# Patient Record
Sex: FEMALE | Race: WHITE | Employment: OTHER | URBAN - METROPOLITAN AREA
[De-identification: names, ages, dates, MRNs, and addresses within clinical notes are randomized per-mention and may not be internally consistent; named-entity substitution may affect disease eponyms.]

---

## 2017-04-10 ENCOUNTER — APPOINTMENT (OUTPATIENT)
Dept: GENERAL RADIOLOGY | Age: 77
DRG: 378 | End: 2017-04-10
Attending: EMERGENCY MEDICINE
Payer: MEDICARE

## 2017-04-10 ENCOUNTER — HOSPITAL ENCOUNTER (INPATIENT)
Age: 77
LOS: 3 days | Discharge: HOME | DRG: 378 | End: 2017-04-13
Attending: EMERGENCY MEDICINE | Admitting: HOSPITALIST
Payer: MEDICARE

## 2017-04-10 ENCOUNTER — APPOINTMENT (OUTPATIENT)
Dept: CT IMAGING | Age: 77
DRG: 378 | End: 2017-04-10
Attending: EMERGENCY MEDICINE
Payer: MEDICARE

## 2017-04-10 DIAGNOSIS — R10.32 ABDOMINAL PAIN, LLQ (LEFT LOWER QUADRANT): ICD-10-CM

## 2017-04-10 DIAGNOSIS — I95.9 TRANSIENT HYPOTENSION: ICD-10-CM

## 2017-04-10 DIAGNOSIS — K62.5 RECTAL BLEEDING: Primary | ICD-10-CM

## 2017-04-10 DIAGNOSIS — D64.9 ANEMIA, UNSPECIFIED TYPE: ICD-10-CM

## 2017-04-10 PROBLEM — K92.1 MELENA: Status: ACTIVE | Noted: 2017-04-10

## 2017-04-10 PROBLEM — K92.2 GI BLEED: Status: ACTIVE | Noted: 2017-04-10

## 2017-04-10 LAB
ALBUMIN SERPL BCP-MCNC: 2.8 G/DL (ref 3.4–5)
ALBUMIN/GLOB SERPL: 1 {RATIO} (ref 0.8–1.7)
ALP SERPL-CCNC: 51 U/L (ref 45–117)
ALT SERPL-CCNC: 21 U/L (ref 13–56)
ANION GAP BLD CALC-SCNC: 12 MMOL/L (ref 3–18)
APPEARANCE UR: CLEAR
APTT PPP: 29 SEC (ref 23–36.4)
AST SERPL W P-5'-P-CCNC: 26 U/L (ref 15–37)
ATRIAL RATE: 86 BPM
BACTERIA URNS QL MICRO: NEGATIVE /HPF
BASOPHILS # BLD AUTO: 0.1 K/UL (ref 0–0.1)
BASOPHILS # BLD: 1 % (ref 0–3)
BILIRUB SERPL-MCNC: 0.3 MG/DL (ref 0.2–1)
BILIRUB UR QL: NEGATIVE
BUN SERPL-MCNC: 23 MG/DL (ref 7–18)
BUN/CREAT SERPL: 21 (ref 12–20)
CALCIUM SERPL-MCNC: 7.9 MG/DL (ref 8.5–10.1)
CALCULATED P AXIS, ECG09: 44 DEGREES
CALCULATED R AXIS, ECG10: 5 DEGREES
CALCULATED T AXIS, ECG11: 72 DEGREES
CHLORIDE SERPL-SCNC: 104 MMOL/L (ref 100–108)
CK MB CFR SERPL CALC: 2 % (ref 0–4)
CK MB SERPL-MCNC: 1.4 NG/ML (ref 5–25)
CK SERPL-CCNC: 71 U/L (ref 26–192)
CO2 SERPL-SCNC: 21 MMOL/L (ref 21–32)
COLOR UR: YELLOW
CREAT SERPL-MCNC: 1.07 MG/DL (ref 0.6–1.3)
DIAGNOSIS, 93000: NORMAL
DIFFERENTIAL METHOD BLD: ABNORMAL
EOSINOPHIL # BLD: 0 K/UL (ref 0–0.4)
EOSINOPHIL NFR BLD: 0 % (ref 0–5)
EPITH CASTS URNS QL MICRO: NORMAL /LPF (ref 0–5)
ERYTHROCYTE [DISTWIDTH] IN BLOOD BY AUTOMATED COUNT: 15.4 % (ref 11.6–14.5)
GLOBULIN SER CALC-MCNC: 2.9 G/DL (ref 2–4)
GLUCOSE SERPL-MCNC: 134 MG/DL (ref 74–99)
GLUCOSE UR STRIP.AUTO-MCNC: NEGATIVE MG/DL
HCT VFR BLD AUTO: 20.7 % (ref 35–45)
HCT VFR BLD AUTO: 26.9 % (ref 35–45)
HGB BLD-MCNC: 6.8 G/DL (ref 12–16)
HGB BLD-MCNC: 9 G/DL (ref 12–16)
HGB UR QL STRIP: NEGATIVE
INR PPP: 1 (ref 0.8–1.2)
KETONES UR QL STRIP.AUTO: NEGATIVE MG/DL
LACTATE BLD-SCNC: 1.4 MMOL/L (ref 0.4–2)
LEUKOCYTE ESTERASE UR QL STRIP.AUTO: ABNORMAL
LIPASE SERPL-CCNC: 426 U/L (ref 73–393)
LYMPHOCYTES # BLD AUTO: 25 % (ref 20–51)
LYMPHOCYTES # BLD: 1.3 K/UL (ref 0.8–3.5)
MCH RBC QN AUTO: 28.9 PG (ref 24–34)
MCHC RBC AUTO-ENTMCNC: 32.9 G/DL (ref 31–37)
MCV RBC AUTO: 88.1 FL (ref 74–97)
MONOCYTES # BLD: 0.3 K/UL (ref 0–1)
MONOCYTES NFR BLD AUTO: 6 % (ref 2–9)
NEUTS BAND NFR BLD MANUAL: 5 % (ref 0–5)
NEUTS SEG # BLD: 3.3 K/UL (ref 1.8–8)
NEUTS SEG NFR BLD AUTO: 63 % (ref 42–75)
NITRITE UR QL STRIP.AUTO: NEGATIVE
P-R INTERVAL, ECG05: 138 MS
PH UR STRIP: 5 [PH] (ref 5–8)
PLATELET # BLD AUTO: 250 K/UL (ref 135–420)
PMV BLD AUTO: 10.6 FL (ref 9.2–11.8)
POTASSIUM SERPL-SCNC: 3.6 MMOL/L (ref 3.5–5.5)
PROT SERPL-MCNC: 5.7 G/DL (ref 6.4–8.2)
PROT UR STRIP-MCNC: NEGATIVE MG/DL
PROTHROMBIN TIME: 12.9 SEC (ref 11.5–15.2)
Q-T INTERVAL, ECG07: 378 MS
QRS DURATION, ECG06: 78 MS
QTC CALCULATION (BEZET), ECG08: 452 MS
RBC # BLD AUTO: 2.35 M/UL (ref 4.2–5.3)
RBC #/AREA URNS HPF: 0 /HPF (ref 0–5)
RBC MORPH BLD: ABNORMAL
SODIUM SERPL-SCNC: 137 MMOL/L (ref 136–145)
SP GR UR REFRACTOMETRY: >1.03 (ref 1–1.03)
TROPONIN I SERPL-MCNC: <0.02 NG/ML (ref 0–0.04)
UROBILINOGEN UR QL STRIP.AUTO: 0.2 EU/DL (ref 0.2–1)
VENTRICULAR RATE, ECG03: 86 BPM
WBC # BLD AUTO: 5.3 K/UL (ref 4.6–13.2)
WBC URNS QL MICRO: NORMAL /HPF (ref 0–4)

## 2017-04-10 PROCEDURE — 85018 HEMOGLOBIN: CPT | Performed by: HOSPITALIST

## 2017-04-10 PROCEDURE — 96374 THER/PROPH/DIAG INJ IV PUSH: CPT

## 2017-04-10 PROCEDURE — 86920 COMPATIBILITY TEST SPIN: CPT | Performed by: EMERGENCY MEDICINE

## 2017-04-10 PROCEDURE — 83605 ASSAY OF LACTIC ACID: CPT

## 2017-04-10 PROCEDURE — 77030013169 SET IV BLD ICUM -A

## 2017-04-10 PROCEDURE — 86900 BLOOD TYPING SEROLOGIC ABO: CPT | Performed by: EMERGENCY MEDICINE

## 2017-04-10 PROCEDURE — 85025 COMPLETE CBC W/AUTO DIFF WBC: CPT | Performed by: EMERGENCY MEDICINE

## 2017-04-10 PROCEDURE — 85610 PROTHROMBIN TIME: CPT | Performed by: EMERGENCY MEDICINE

## 2017-04-10 PROCEDURE — 99285 EMERGENCY DEPT VISIT HI MDM: CPT

## 2017-04-10 PROCEDURE — 74011636320 HC RX REV CODE- 636/320: Performed by: EMERGENCY MEDICINE

## 2017-04-10 PROCEDURE — 81001 URINALYSIS AUTO W/SCOPE: CPT | Performed by: EMERGENCY MEDICINE

## 2017-04-10 PROCEDURE — 80053 COMPREHEN METABOLIC PANEL: CPT | Performed by: EMERGENCY MEDICINE

## 2017-04-10 PROCEDURE — 82550 ASSAY OF CK (CPK): CPT | Performed by: EMERGENCY MEDICINE

## 2017-04-10 PROCEDURE — 36430 TRANSFUSION BLD/BLD COMPNT: CPT

## 2017-04-10 PROCEDURE — 74011250636 HC RX REV CODE- 250/636: Performed by: EMERGENCY MEDICINE

## 2017-04-10 PROCEDURE — 83690 ASSAY OF LIPASE: CPT | Performed by: EMERGENCY MEDICINE

## 2017-04-10 PROCEDURE — P9016 RBC LEUKOCYTES REDUCED: HCPCS | Performed by: EMERGENCY MEDICINE

## 2017-04-10 PROCEDURE — 85730 THROMBOPLASTIN TIME PARTIAL: CPT | Performed by: EMERGENCY MEDICINE

## 2017-04-10 PROCEDURE — 71010 XR CHEST SNGL V: CPT

## 2017-04-10 PROCEDURE — 93005 ELECTROCARDIOGRAM TRACING: CPT

## 2017-04-10 PROCEDURE — 65660000000 HC RM CCU STEPDOWN

## 2017-04-10 PROCEDURE — 36415 COLL VENOUS BLD VENIPUNCTURE: CPT | Performed by: HOSPITALIST

## 2017-04-10 PROCEDURE — 74011250636 HC RX REV CODE- 250/636: Performed by: HOSPITALIST

## 2017-04-10 PROCEDURE — C9113 INJ PANTOPRAZOLE SODIUM, VIA: HCPCS | Performed by: EMERGENCY MEDICINE

## 2017-04-10 PROCEDURE — 30233N0 TRANSFUSION OF AUTOLOGOUS RED BLOOD CELLS INTO PERIPHERAL VEIN, PERCUTANEOUS APPROACH: ICD-10-PCS | Performed by: HOSPITALIST

## 2017-04-10 PROCEDURE — 74177 CT ABD & PELVIS W/CONTRAST: CPT

## 2017-04-10 RX ORDER — SODIUM CHLORIDE 9 MG/ML
1000 INJECTION, SOLUTION INTRAVENOUS
Status: COMPLETED | OUTPATIENT
Start: 2017-04-10 | End: 2017-04-10

## 2017-04-10 RX ORDER — SODIUM CHLORIDE 9 MG/ML
250 INJECTION, SOLUTION INTRAVENOUS AS NEEDED
Status: CANCELLED | OUTPATIENT
Start: 2017-04-10

## 2017-04-10 RX ORDER — SODIUM CHLORIDE 9 MG/ML
250 INJECTION, SOLUTION INTRAVENOUS AS NEEDED
Status: DISCONTINUED | OUTPATIENT
Start: 2017-04-10 | End: 2017-04-13 | Stop reason: HOSPADM

## 2017-04-10 RX ORDER — RALOXIFENE HYDROCHLORIDE 60 MG/1
60 TABLET, FILM COATED ORAL DAILY
Status: DISCONTINUED | OUTPATIENT
Start: 2017-04-11 | End: 2017-04-13 | Stop reason: HOSPADM

## 2017-04-10 RX ORDER — PANTOPRAZOLE SODIUM 40 MG/10ML
80 INJECTION, POWDER, LYOPHILIZED, FOR SOLUTION INTRAVENOUS
Status: COMPLETED | OUTPATIENT
Start: 2017-04-10 | End: 2017-04-10

## 2017-04-10 RX ORDER — PANTOPRAZOLE SODIUM 40 MG/1
40 TABLET, DELAYED RELEASE ORAL
Status: DISCONTINUED | OUTPATIENT
Start: 2017-04-11 | End: 2017-04-11

## 2017-04-10 RX ORDER — DEXTROSE, SODIUM CHLORIDE, AND POTASSIUM CHLORIDE 5; .45; .15 G/100ML; G/100ML; G/100ML
75 INJECTION INTRAVENOUS CONTINUOUS
Status: DISCONTINUED | OUTPATIENT
Start: 2017-04-10 | End: 2017-04-12

## 2017-04-10 RX ORDER — SODIUM CHLORIDE 0.9 % (FLUSH) 0.9 %
5-10 SYRINGE (ML) INJECTION AS NEEDED
Status: DISCONTINUED | OUTPATIENT
Start: 2017-04-10 | End: 2017-04-13 | Stop reason: HOSPADM

## 2017-04-10 RX ORDER — SODIUM CHLORIDE 0.9 % (FLUSH) 0.9 %
5-10 SYRINGE (ML) INJECTION EVERY 8 HOURS
Status: DISCONTINUED | OUTPATIENT
Start: 2017-04-10 | End: 2017-04-13 | Stop reason: HOSPADM

## 2017-04-10 RX ADMIN — DEXTROSE MONOHYDRATE, SODIUM CHLORIDE, AND POTASSIUM CHLORIDE 150 ML/HR: 50; 4.5; 1.49 INJECTION, SOLUTION INTRAVENOUS at 18:32

## 2017-04-10 RX ADMIN — SODIUM CHLORIDE 1000 ML: 900 INJECTION, SOLUTION INTRAVENOUS at 09:21

## 2017-04-10 RX ADMIN — PANTOPRAZOLE SODIUM 80 MG: 40 INJECTION, POWDER, FOR SOLUTION INTRAVENOUS at 09:21

## 2017-04-10 RX ADMIN — Medication 10 ML: at 22:41

## 2017-04-10 RX ADMIN — Medication 10 ML: at 18:33

## 2017-04-10 RX ADMIN — SODIUM CHLORIDE 1000 ML: 9 INJECTION, SOLUTION INTRAVENOUS at 11:40

## 2017-04-10 RX ADMIN — IOPAMIDOL 71 ML: 612 INJECTION, SOLUTION INTRAVENOUS at 09:39

## 2017-04-10 NOTE — H&P
History and Physical    Patient: Sybil Garza               Sex: female          DOA: 4/10/2017       YOB: 1940      Age:  76 y.o.        LOS:  LOS: 0 days        HPI:     Sybil Garza is a 76 y.o. female who presented to the ER with black stools. She began having diarrheal stools yesterday morning which were black. She had no nausea or vomiting. She has not had this in the past.  This was associated with weakness and dizziness. She did not have chest pain or shortness of breath. She presented to the ER where she has heme positive stool and severely decreased hemoglobin. Initially her BP was decreased but this has improved with blood transfusion and with fluid. She will be admitted for ongoing management. Past Medical History:   Diagnosis Date    Follow-up exam     Hypertension     Menopause     Thyroid disease        Social History:   Tobacco use:  Patient does not use tobacco   Alcohol use:  Patient does not use alcohol    Family History:   Multiple family members with hypertension    Review of Systems    Constitutional:  No fever or weight loss  HEENT:  No headache or visual changes  Cardiovascular:  No chest pain or diaphoresis  Respiratory:  No coughing, wheezing, or shortness of breath. GI:  Black stools as reported above. No nausea or vomiting. :  No hematuria or dysuria  Skin:  No rashes or moles  Neuro:  No seizures or syncope  Hematological:  No bruising or bleeding  Endocrine:  No diabetes or thyroid disease    Physical Exam:      Visit Vitals    /61    Pulse 75    Temp 98.9 °F (37.2 °C)    Resp 16    Ht 4' 11\" (1.499 m)    Wt 63.5 kg (140 lb)    SpO2 98%    BMI 28.28 kg/m2       Physical Exam:    Gen:  No distress, alert  HEENT:  Normal cephalic atraumatic, extra-occular movements are intact.   Neck:  Supple, No JVD  Lungs:  Clear bilaterally, no wheeze, no rales, normal effort  Heart:  Regular Rate and Rhythm, normal S1 and S2, no edema  Abdomen: Soft, non tender, normal bowel sounds, no guarding. Extremities:  Well perfused, no cyanosis or edema  Neurological:  Awake and alert, CN's are intact, normal strength throughout extremities  Skin:  No rashes or moles  Mental Status:  Normal thought process, does not appear anxious    Laboratory Studies:    BMP:   Lab Results   Component Value Date/Time     04/10/2017 08:45 AM    K 3.6 04/10/2017 08:45 AM     04/10/2017 08:45 AM    CO2 21 04/10/2017 08:45 AM    AGAP 12 04/10/2017 08:45 AM     (H) 04/10/2017 08:45 AM    BUN 23 (H) 04/10/2017 08:45 AM    CREA 1.07 04/10/2017 08:45 AM    GFRAA 60 (L) 04/10/2017 08:45 AM    GFRNA 49 (L) 04/10/2017 08:45 AM     CBC:   Lab Results   Component Value Date/Time    WBC 5.3 04/10/2017 08:45 AM    HGB 8.6 (L) 04/11/2017 12:25 AM    HCT 25.9 (L) 04/11/2017 12:25 AM     04/10/2017 08:45 AM       Assessment/Plan     Principal Problem:    GI bleed (4/10/2017)    Active Problems:    Melena (4/10/2017)      Hypotension (4/10/2017)        PLAN:    Blood transfusion in ER  Increased IVF  Follow H/H  GI consult, discussed with Dr Adrián Avila.

## 2017-04-10 NOTE — PROGRESS NOTES
completed the initial Spiritual Assessment of the patient in bed 4 of the emergency room at 1400, and offered Pastoral Care support. Patient does not have any Gnosticism/cultural needs that will affect patients preferences in health care.    Chaplains will continue to follow and will provide pastoral care on an as needed/requested basis     Chaplain Rj London   Board Certified [address] [place] [address]   [phone]

## 2017-04-10 NOTE — Clinical Note
Status[de-identified] Inpatient [101] Type of Bed: Stepdown [17] Inpatient Hospitalization Certified Necessary for the Following Reasons: 3. Patient receiving treatment that can only be provided in an inpatient setting (further clarification in H&P documentation) Admitting Diagnosis: Melena [610883] Admitting Diagnosis: Hypotension [931595] Admitting Physician: Bozena Patricia Attending Physician: Bozena Patricia Estimated Length of Stay: 3-4 Midnights Discharge Plan[de-identified] Home with Office Follow-up

## 2017-04-10 NOTE — ED NOTES
Patient is aware that a urine sample is needed, attempted to provide a sample, unable to do so at the present time.

## 2017-04-10 NOTE — IP AVS SNAPSHOT
[address] [address] 
[phone] Patient: Aubrie Paige MRN: [id number] JBD:2/23/1958 You are allergic to the following No active allergies Recent Documentation Height Weight BMI OB Status Smoking Status 1.499 m 64 kg 28.48 kg/m2 Menopause Never Smoker Emergency Contacts Name Discharge Info Relation Home Work Mobile Amber Dawson  Sister [23] [phone] Yolanda Solano DISCHARGE CAREGIVER [3] Daughter [21] [phone] About your hospitalization You were admitted on:  April 10, 2017 You last received care in the:  [facility] You were discharged on:  April 13, 2017 Unit phone number:  [phone] Why you were hospitalized Your primary diagnosis was:  Gi Bleed Your diagnoses also included:  Melena, Hypotension Providers Seen During Your Hospitalizations Provider Role Specialty Primary office phone Wanda Blake MD Attending Provider Emergency Medicine [phone] Gideon Vidal MD Attending Provider [facility] [phone] Joyce Pratt MD Attending Provider Internal Medicine [phone] Your Primary Care Physician (PCP) Primary Care Physician Office Phone Office Fax [address]  [phone] Follow-up Information Follow up With Details Comments Contact Info Jorge Skinner MD In 1 week  Critical access hospital [address] [address] [phone] Current Discharge Medication List  
  
START taking these medications Dose & Instructions Dispensing Information Comments Morning Noon Evening Bedtime  
 pantoprazole 40 mg tablet Commonly known as:  PROTONIX Your last dose was: Your next dose is:    
   
   
 Dose:  40 mg Take 1 Tab by mouth Before breakfast and dinner. Quantity:  60 Tab Refills:  0 CONTINUE these medications which have NOT CHANGED Dose & Instructions Dispensing Information Comments Morning Noon Evening Bedtime  
 dilTIAZem  mg XR capsule Commonly known as:  DILACOR XR Your last dose was: Your next dose is: Take  by mouth daily. Refills:  0 EVISTA 60 mg tablet Generic drug:  raloxifene Your last dose was: Your next dose is: Take  by mouth daily. Refills:  0  
     
   
   
   
  
 lisinopril 20 mg tablet Commonly known as:  Marietta Gear Your last dose was: Your next dose is: Take  by mouth daily. Refills:  0  
     
   
   
   
  
 metoprolol tartrate 25 mg tablet Commonly known as:  LOPRESSOR Your last dose was: Your next dose is: Take  by mouth two (2) times a day. Refills:  0  
     
   
   
   
  
 SYNTHROID PO Your last dose was: Your next dose is: Take  by mouth. Refills:  0 STOP taking these medications NexIUM 40 mg capsule Generic drug:  esomeprazole Where to Get Your Medications Information on where to get these meds will be given to you by the nurse or doctor. ! Ask your nurse or doctor about these medications  
  pantoprazole 40 mg tablet Discharge Instructions DISCHARGE SUMMARY from Nurse The following personal items are in your possession at time of discharge: 
 
Dental Appliances: At bedside Visual Aid: None Home Medications: None Jewelry: Watch Clothing: At bedside, Shirt, Socks Other Valuables: Cell Valarie, Tari PATIENT INSTRUCTIONS: 
 
 
F-face looks uneven A-arms unable to move or move unevenly S-speech slurred or non-existent T-time-call 911 as soon as signs and symptoms begin-DO NOT go Back to bed or wait to see if you get better-TIME IS BRAIN. Warning Signs of HEART ATTACK Call 911 if you have these symptoms: 
? Chest discomfort. Most heart attacks involve discomfort in the center of the chest that lasts more than a few minutes, or that goes away and comes back. It can feel like uncomfortable pressure, squeezing, fullness, or pain. ? Discomfort in other areas of the upper body. Symptoms can include pain or discomfort in one or both arms, the back, neck, jaw, or stomach. ? Shortness of breath with or without chest discomfort. ? Other signs may include breaking out in a cold sweat, nausea, or lightheadedness. Don't wait more than five minutes to call [address]! Fast action can save your life. Calling 911 is almost always the fastest way to get lifesaving treatment. Emergency Medical Services staff can begin treatment when they arrive  up to an hour sooner than if someone gets to the hospital by car. The discharge information has been reviewed with the patient. The patient verbalized understanding. Discharge medications reviewed with the patient and appropriate educational materials and side effects teaching were provided. Discharge Orders None [place] Announcement We are excited to announce that we are making your provider's discharge notes available to you in [employer]. You will see these notes when they are completed and signed by the physician that discharged you from your recent hospital stay.   If you have any questions or concerns about any information you see in [employer], please call the [facility] where you were seen or reach out to your Primary Care Provider for more information about your plan of care. Introducing [facility]! Tati Francis introduces [employer] patient portal. Now you can access parts of your medical record, email your doctor's office, and request medication refills online. 1. In your internet browser, go to [email or web address] 2. Click on the First Time User? Click Here link in the Sign In box. You will see the New Member Sign Up page. 3. Enter your [employer] Access Code exactly as it appears below. You will not need to use this code after youve completed the sign-up process. If you do not sign up before the expiration date, you must request a new code. · [employer] Access Code: [email or web address] Expires: 7/9/2017  8:50 AM 
 
4. Enter the last four digits of your Social Security Number (xxxx) and Date of Birth (mm/dd/yyyy) as indicated and click Submit. You will be taken to the next sign-up page. 5. Create a [employer] ID. This will be your [employer] login ID and cannot be changed, so think of one that is secure and easy to remember. 6. Create a [employer] password. You can change your password at any time. 7. Enter your Password Reset Question and Answer. This can be used at a later time if you forget your password. 8. Enter your e-mail address. You will receive e-mail notification when new information is available in [address] 9. Click Sign Up. You can now view and download portions of your medical record. 10. Click the Download Summary menu link to download a portable copy of your medical information. If you have questions, please visit the Frequently Asked Questions section of the [employer] website. Remember, [employer] is NOT to be used for urgent needs. For medical emergencies, dial 911. Now available from your iPhone and Android! General Information Please provide this summary of care documentation to your next provider. Patient Signature:  ____________________________________________________________ Date:  ____________________________________________________________  
  
Barbara Homes Provider Signature:  ____________________________________________________________ Date:  ____________________________________________________________

## 2017-04-10 NOTE — PROGRESS NOTES
Bedside and Verbal shift change report received from [address] [facility] (offgoing nurse). Report included the following information SBAR, Kardex, Intake/Output, MAR and Recent Results. 2000-Admission assessment completed, pt alert and oriented x4, no complaints of pain, call bell within reach, bed locked in low position, will continue to monitor. 2220-Admission database completed, pt informed of home medication that needs to be brought to the hospital. Assisted to the restroom. 0016-Pt currently sleeping, no distress noted, will continue to monitor. 0128-New bag iv fluid hung. Pt sleeping. 0406-Pt sleeping, will continue to monitor. Bedside and Verbal shift change report given to Reuben Groves (oncoming nurse) by Mirian Mejia RN (offgoing nurse). Report included the following information SBAR, Kardex, Intake/Output, MAR and Recent Results.

## 2017-04-10 NOTE — ED PROVIDER NOTES
HPI Comments: 8:29 AM Martine Paige is a 76 y.o. female with a history of thyroid disease and HTN, who presents to the ED for evaluation of black, tarry diarrhea that she states began last night. She states that yesterday, she was having some abdominal pain, which resolved after she began having diarrhea. She explains that since the diarrhea began, she has been feeling dizzy and weak. She explains that she has noticed this black stool several times over the past couple of weeks. She states that she is not on any blood thinners. She states that she has no hx of irregular heart beats, heart attacks or strokes. She denies any fever, chills, CP, SOB, vomiting, weakness, or difficulty urinating. There are no other concerns at this time. Patient is a 76 y.o. female presenting with anal bleeding and fatigue. The history is provided by the patient. Rectal Bleeding    Associated symptoms include abdominal pain and diarrhea. Pertinent negatives include no dysuria, no chills, no fever, no nausea and no vomiting. Fatigue   Pertinent negatives include no shortness of breath, no chest pain, no vomiting, no headaches and no nausea. Past Medical History:   Diagnosis Date    Follow-up exam     Hypertension     Menopause     Thyroid disease        Past Surgical History:   Procedure Laterality Date    HX CHOLECYSTECTOMY  3/8/2010    HX OTHER SURGICAL      stints in stomach         History reviewed. No pertinent family history. Social History     Social History    Marital status:      Spouse name: N/A    Number of children: N/A    Years of education: N/A     Occupational History    Not on file.      Social History Main Topics    Smoking status: Never Smoker    Smokeless tobacco: Not on file    Alcohol use No    Drug use: Not on file    Sexual activity: Not on file     Other Topics Concern    Not on file     Social History Narrative         ALLERGIES: Review of patient's allergies indicates no known allergies. Review of Systems   Constitutional: Negative for chills, fatigue and fever. HENT: Negative for congestion, rhinorrhea and sore throat. Respiratory: Negative for cough and shortness of breath. Cardiovascular: Negative for chest pain and palpitations. Gastrointestinal: Positive for abdominal pain and diarrhea. Negative for nausea and vomiting. Positive for black, tarry stool. Genitourinary: Negative for difficulty urinating, dysuria, hematuria and urgency. Musculoskeletal: Negative for myalgias. Skin: Negative for rash and wound. Neurological: Positive for dizziness and weakness. Negative for headaches. All other systems reviewed and are negative. Vitals:    04/10/17 1431 04/10/17 1445 04/10/17 1500 04/10/17 1509   BP: 139/84 109/47 (!) 83/62    Pulse:  79 76    Resp:  16 16    Temp:    98.5 °F (36.9 °C)   SpO2:  100% 100%    Weight:       Height:       99% on RA, indicating adequate oxygenation. Physical Exam   Constitutional: She is oriented to person, place, and time. She appears well-developed and well-nourished. No distress. HENT:   Head: Normocephalic and atraumatic. Mouth/Throat: Oropharynx is clear and moist.   Eyes: Conjunctivae and EOM are normal. Pupils are equal, round, and reactive to light. No scleral icterus. Neck: Normal range of motion. Neck supple. Cardiovascular: Normal rate, regular rhythm and normal heart sounds. No murmur heard. Pulmonary/Chest: Effort normal and breath sounds normal. No respiratory distress. Abdominal: Soft. Bowel sounds are normal. She exhibits no distension. There is no tenderness. Musculoskeletal: She exhibits no edema. Lymphadenopathy:     She has no cervical adenopathy. Neurological: She is alert and oriented to person, place, and time. Coordination normal.   Skin: Skin is warm and dry. No rash noted. Psychiatric: She has a normal mood and affect.  Her behavior is normal.   Nursing note and vitals reviewed. MDM  Number of Diagnoses or Management Options  Abdominal pain, LLQ (left lower quadrant):   Rectal bleeding:   Transient hypotension:   Diagnosis management comments: Melena, no hematochezia, LLQ pain and hypotensive in triage. Will check for diverticulosis, ulcer, CT will be done for mesenteric ischemia. Will give hydration. Patient will need to be admitted for further management. Critical Care  Total time providing critical care: 30-74 minutes    ED Course       Procedures        Lab findings:  Recent Results (from the past 12 hour(s))   CBC WITH AUTOMATED DIFF    Collection Time: 04/10/17  8:45 AM   Result Value Ref Range    WBC 5.3 4.6 - 13.2 K/uL    RBC 2.35 (L) 4.20 - 5.30 M/uL    HGB 6.8 (L) 12.0 - 16.0 g/dL    HCT 20.7 (L) 35.0 - 45.0 %    MCV 88.1 74.0 - 97.0 FL    MCH 28.9 24.0 - 34.0 PG    MCHC 32.9 31.0 - 37.0 g/dL    RDW 15.4 (H) 11.6 - 14.5 %    PLATELET 733 060 - 441 K/uL    MPV 10.6 9.2 - 11.8 FL    NEUTROPHILS 63 42 - 75 %    BAND NEUTROPHILS 5 0 - 5 %    LYMPHOCYTES 25 20 - 51 %    MONOCYTES 6 2 - 9 %    EOSINOPHILS 0 0 - 5 %    BASOPHILS 1 0 - 3 %    ABS. NEUTROPHILS 3.3 1.8 - 8.0 K/UL    ABS. LYMPHOCYTES 1.3 0.8 - 3.5 K/UL    ABS. MONOCYTES 0.3 0 - 1.0 K/UL    ABS. EOSINOPHILS 0.0 0.0 - 0.4 K/UL    ABS.  BASOPHILS 0.1 0.0 - 0.1 K/UL    RBC COMMENTS ANISOCYTOSIS  1+        RBC COMMENTS POLYCHROMASIA  1+        RBC COMMENTS OVALOCYTES  1+        RBC COMMENTS MARY CELLS  1+        DF MANUAL     CARDIAC PANEL,(CK, CKMB & TROPONIN)    Collection Time: 04/10/17  8:45 AM   Result Value Ref Range    CK 71 26 - 192 U/L    CK - MB 1.4 <3.6 ng/ml    CK-MB Index 2.0 0.0 - 4.0 %    Troponin-I, Qt. <0.02 0.0 - 0.045 NG/ML   TYPE & SCREEN    Collection Time: 04/10/17  8:45 AM   Result Value Ref Range    Crossmatch Expiration 04/13/2017     ABO/Rh(D) A POSITIVE     Antibody screen NEG     CALLED TO:  Aditi Remy, 5111, AT [address] ON 04/10/2017 BY [address] [place] [address]     Unit number [id number] Blood component type RC LR AS1     Unit division 00     Status of unit ISSUED     Crossmatch result Compatible     Unit number [id number]     Blood component type RC LR AS1     Unit division 00     Status of unit ISSUED     Crossmatch result Compatible    LIPASE    Collection Time: 04/10/17  8:45 AM   Result Value Ref Range    Lipase 426 (H) 73 - 025 U/L   METABOLIC PANEL, COMPREHENSIVE    Collection Time: 04/10/17  8:45 AM   Result Value Ref Range    Sodium 137 136 - 145 mmol/L    Potassium 3.6 3.5 - 5.5 mmol/L    Chloride 104 100 - 108 mmol/L    CO2 21 21 - 32 mmol/L    Anion gap 12 3.0 - 18 mmol/L    Glucose 134 (H) 74 - 99 mg/dL    BUN 23 (H) 7.0 - 18 MG/DL    Creatinine 1.07 0.6 - 1.3 MG/DL    BUN/Creatinine ratio 21 (H) 12 - 20      GFR est AA 60 (L) >60 ml/min/1.73m2    GFR est non-AA 49 (L) >60 ml/min/1.73m2    Calcium 7.9 (L) 8.5 - 10.1 MG/DL    Bilirubin, total 0.3 0.2 - 1.0 MG/DL    ALT (SGPT) 21 13 - 56 U/L    AST (SGOT) 26 15 - 37 U/L    Alk.  phosphatase 51 45 - 117 U/L    Protein, total 5.7 (L) 6.4 - 8.2 g/dL    Albumin 2.8 (L) 3.4 - 5.0 g/dL    Globulin 2.9 2.0 - 4.0 g/dL    A-G Ratio 1.0 0.8 - 1.7     PTT    Collection Time: 04/10/17  8:45 AM   Result Value Ref Range    aPTT 29.0 23.0 - 36.4 SEC   PROTHROMBIN TIME + INR    Collection Time: 04/10/17  8:45 AM   Result Value Ref Range    Prothrombin time 12.9 11.5 - 15.2 sec    INR 1.0 0.8 - 1.2     POC LACTIC ACID    Collection Time: 04/10/17  9:04 AM   Result Value Ref Range    Lactic Acid (POC) 1.4 0.4 - 2.0 mmol/L   EKG, 12 LEAD, INITIAL    Collection Time: 04/10/17  9:04 AM   Result Value Ref Range    Ventricular Rate 86 BPM    Atrial Rate 86 BPM    P-R Interval 138 ms    QRS Duration 78 ms    Q-T Interval 378 ms    QTC Calculation (Bezet) 452 ms    Calculated P Axis 44 degrees    Calculated R Axis 5 degrees    Calculated T Axis 72 degrees    Diagnosis       Normal sinus rhythm  Nonspecific ST and T wave abnormality  Abnormal ECG  When compared with ECG of 03-MAR-2010 15:01,  Nonspecific T wave abnormality now evident in Lateral leads  Confirmed by Tomma Block ([id number]) on 4/10/2017 3:17:48 PM     URINALYSIS W/ RFLX MICROSCOPIC    Collection Time: 04/10/17  2:25 PM   Result Value Ref Range    Color YELLOW      Appearance CLEAR      Specific gravity >1.030 (H) 1.005 - 1.030    pH (UA) 5.0 5.0 - 8.0      Protein NEGATIVE  NEG mg/dL    Glucose NEGATIVE  NEG mg/dL    Ketone NEGATIVE  NEG mg/dL    Bilirubin NEGATIVE  NEG      Blood NEGATIVE  NEG      Urobilinogen 0.2 0.2 - 1.0 EU/dL    Nitrites NEGATIVE  NEG      Leukocyte Esterase TRACE (A) NEG     URINE MICROSCOPIC ONLY    Collection Time: 04/10/17  2:25 PM   Result Value Ref Range    WBC 0 to 3 0 - 4 /hpf    RBC 0 0 - 5 /hpf    Epithelial cells FEW 0 - 5 /lpf    Bacteria NEGATIVE  NEG /hpf       EKG interpretation by ED Physician:  9:06 AM Normal sinus rhythm. Normal intervals. T wave inversion anterior wall precordial leads. X-Ray, CT or other radiology findings or impressions:  CT ABD PELV W CONT   Final Result   IMPRESSION:     1. No acute intra-abdominal process is identified. Mild colonic diverticulosis  without CT evidence of acute diverticulitis.     2. Interval cholecystectomy. No abnormal biliary dilatation.     3. Interval enlargement of left renal lower pole hypodense simple appearing  renal cyst. Simple cysts can increase in size over time. Suggest follow-up renal  ultrasound 3-6 months.     4. Stable bibasilar scarring versus atelectasis. Stable subpleural right middle  lobe nodule, very likely benign given the lengthy stability since 2/18/2010. XR CHEST SNGL V   Final Result   Impression:  No radiographic evidence of an acute abnormality.          Progress notes, Consult notes, additional Procedure notes, and/or Re-evaluation:   9:33 AM Consult: I discussed care with Dr. Vaishali Juarez, hospitalist. It was a standard discussion, including history of patients chief complaint, available diagnostic results, and treatment course. He agrees to admit the patient to step-down. 9:39 AM Type and screen match ordered. Will continue to monitor blood pressure. If further assistance is needed, will get crossed, but no further assistance is needed at this time. \    1:10 PM Placed call out to Dr. Chauncey Contreras. 1:24 PM Consult: I discussed care with Dr. Chauncey Contreras GI. It was a standard discussion, including history of patients chief complaint, available diagnostic results, and treatment course. He agrees to see the patient on consult. Disposition:  Diagnosis:   1. Rectal bleeding    2. Abdominal pain, LLQ (left lower quadrant)    3. Transient hypotension    4. Anemia, unspecified type          Disposition: Admit      Scribe Attestation:   Tim Escobar acting as a scribe for and in the presence of Dr. Apryl Casiano MD     Signed by: Fito Rodriguez Cap, April 10, 2017 at 10:05 AM     Provider Attestation:   I personally performed the services described in the documentation, reviewed the documentation, as recorded by the scribe in my presence, and it accurately and completely records my words and actions.      Reviewed and signed by:  Dr. Apryl Casiano MD

## 2017-04-10 NOTE — IP AVS SNAPSHOT
Current Discharge Medication List  
  
START taking these medications Dose & Instructions Dispensing Information Comments Morning Noon Evening Bedtime  
 pantoprazole 40 mg tablet Commonly known as:  PROTONIX Your last dose was: Your next dose is:    
   
   
 Dose:  40 mg Take 1 Tab by mouth Before breakfast and dinner. Quantity:  60 Tab Refills:  0 CONTINUE these medications which have NOT CHANGED Dose & Instructions Dispensing Information Comments Morning Noon Evening Bedtime  
 dilTIAZem  mg XR capsule Commonly known as:  DILACOR XR Your last dose was: Your next dose is: Take  by mouth daily. Refills:  0 EVISTA 60 mg tablet Generic drug:  raloxifene Your last dose was: Your next dose is: Take  by mouth daily. Refills:  0  
     
   
   
   
  
 lisinopril 20 mg tablet Commonly known as:  Myranda Gravel Your last dose was: Your next dose is: Take  by mouth daily. Refills:  0  
     
   
   
   
  
 metoprolol tartrate 25 mg tablet Commonly known as:  LOPRESSOR Your last dose was: Your next dose is: Take  by mouth two (2) times a day. Refills:  0  
     
   
   
   
  
 SYNTHROID PO Your last dose was: Your next dose is: Take  by mouth. Refills:  0 STOP taking these medications NexIUM 40 mg capsule Generic drug:  esomeprazole Where to Get Your Medications Information on where to get these meds will be given to you by the nurse or doctor. ! Ask your nurse or doctor about these medications  
  pantoprazole 40 mg tablet

## 2017-04-10 NOTE — ROUTINE PROCESS
1815 -patient transported to 3013 via stretcher from ER - alert and oriented. Received 2 units PRBC in ER - no complaints of pain or dizziness. oriented to unit and call system .  Up to bathroom with assist.  Placed on tele and ST confirmed with monitor tech

## 2017-04-11 ENCOUNTER — SURGERY (OUTPATIENT)
Age: 77
End: 2017-04-11

## 2017-04-11 ENCOUNTER — ANESTHESIA EVENT (OUTPATIENT)
Dept: ENDOSCOPY | Age: 77
DRG: 378 | End: 2017-04-11
Payer: MEDICARE

## 2017-04-11 ENCOUNTER — ANESTHESIA (OUTPATIENT)
Dept: ENDOSCOPY | Age: 77
DRG: 378 | End: 2017-04-11
Payer: MEDICARE

## 2017-04-11 LAB
ANION GAP BLD CALC-SCNC: 8 MMOL/L (ref 3–18)
BASOPHILS # BLD AUTO: 0 K/UL (ref 0–0.06)
BASOPHILS # BLD: 0 % (ref 0–2)
BUN SERPL-MCNC: 19 MG/DL (ref 7–18)
BUN/CREAT SERPL: 24 (ref 12–20)
CALCIUM SERPL-MCNC: 7.4 MG/DL (ref 8.5–10.1)
CHLORIDE SERPL-SCNC: 116 MMOL/L (ref 100–108)
CO2 SERPL-SCNC: 19 MMOL/L (ref 21–32)
CREAT SERPL-MCNC: 0.79 MG/DL (ref 0.6–1.3)
DIFFERENTIAL METHOD BLD: ABNORMAL
EOSINOPHIL # BLD: 0 K/UL (ref 0–0.4)
EOSINOPHIL NFR BLD: 0 % (ref 0–5)
ERYTHROCYTE [DISTWIDTH] IN BLOOD BY AUTOMATED COUNT: 15.7 % (ref 11.6–14.5)
GLUCOSE SERPL-MCNC: 111 MG/DL (ref 74–99)
HCT VFR BLD AUTO: 23.9 % (ref 35–45)
HCT VFR BLD AUTO: 24.9 % (ref 35–45)
HCT VFR BLD AUTO: 25.9 % (ref 35–45)
HGB BLD-MCNC: 8.1 G/DL (ref 12–16)
HGB BLD-MCNC: 8.5 G/DL (ref 12–16)
HGB BLD-MCNC: 8.6 G/DL (ref 12–16)
HGB BLD-MCNC: 8.9 G/DL (ref 12–16)
LYMPHOCYTES # BLD AUTO: 30 % (ref 21–52)
LYMPHOCYTES # BLD: 1.5 K/UL (ref 0.9–3.6)
MCH RBC QN AUTO: 29.6 PG (ref 24–34)
MCHC RBC AUTO-ENTMCNC: 33.9 G/DL (ref 31–37)
MCV RBC AUTO: 87.2 FL (ref 74–97)
MONOCYTES # BLD: 0.7 K/UL (ref 0.05–1.2)
MONOCYTES NFR BLD AUTO: 13 % (ref 3–10)
NEUTS SEG # BLD: 2.9 K/UL (ref 1.8–8)
NEUTS SEG NFR BLD AUTO: 57 % (ref 40–73)
PLATELET # BLD AUTO: 177 K/UL (ref 135–420)
PMV BLD AUTO: 11.2 FL (ref 9.2–11.8)
POTASSIUM SERPL-SCNC: 4 MMOL/L (ref 3.5–5.5)
RBC # BLD AUTO: 2.74 M/UL (ref 4.2–5.3)
SODIUM SERPL-SCNC: 143 MMOL/L (ref 136–145)
WBC # BLD AUTO: 5.2 K/UL (ref 4.6–13.2)

## 2017-04-11 PROCEDURE — 65660000000 HC RM CCU STEPDOWN

## 2017-04-11 PROCEDURE — 77030032490 HC SLV COMPR SCD KNE COVD -B

## 2017-04-11 PROCEDURE — 88305 TISSUE EXAM BY PATHOLOGIST: CPT | Performed by: INTERNAL MEDICINE

## 2017-04-11 PROCEDURE — 85018 HEMOGLOBIN: CPT | Performed by: HOSPITALIST

## 2017-04-11 PROCEDURE — 74011250636 HC RX REV CODE- 250/636

## 2017-04-11 PROCEDURE — 74011000258 HC RX REV CODE- 258: Performed by: HOSPITALIST

## 2017-04-11 PROCEDURE — 74011250636 HC RX REV CODE- 250/636: Performed by: HOSPITALIST

## 2017-04-11 PROCEDURE — 77030004927 HC CATH ELECHEMSTAS BSC -C: Performed by: INTERNAL MEDICINE

## 2017-04-11 PROCEDURE — 77030009426 HC FCPS BIOP ENDOSC BSC -B: Performed by: INTERNAL MEDICINE

## 2017-04-11 PROCEDURE — 74011250637 HC RX REV CODE- 250/637: Performed by: EMERGENCY MEDICINE

## 2017-04-11 PROCEDURE — 80048 BASIC METABOLIC PNL TOTAL CA: CPT | Performed by: HOSPITALIST

## 2017-04-11 PROCEDURE — 85025 COMPLETE CBC W/AUTO DIFF WBC: CPT | Performed by: HOSPITALIST

## 2017-04-11 PROCEDURE — 0DB68ZX EXCISION OF STOMACH, VIA NATURAL OR ARTIFICIAL OPENING ENDOSCOPIC, DIAGNOSTIC: ICD-10-PCS | Performed by: INTERNAL MEDICINE

## 2017-04-11 PROCEDURE — 74011250637 HC RX REV CODE- 250/637: Performed by: HOSPITALIST

## 2017-04-11 PROCEDURE — 88342 IMHCHEM/IMCYTCHM 1ST ANTB: CPT | Performed by: INTERNAL MEDICINE

## 2017-04-11 PROCEDURE — 36415 COLL VENOUS BLD VENIPUNCTURE: CPT | Performed by: HOSPITALIST

## 2017-04-11 PROCEDURE — 76060000031 HC ANESTHESIA FIRST 0.5 HR: Performed by: INTERNAL MEDICINE

## 2017-04-11 PROCEDURE — C9113 INJ PANTOPRAZOLE SODIUM, VIA: HCPCS | Performed by: HOSPITALIST

## 2017-04-11 PROCEDURE — 0W3P8ZZ CONTROL BLEEDING IN GASTROINTESTINAL TRACT, VIA NATURAL OR ARTIFICIAL OPENING ENDOSCOPIC: ICD-10-PCS | Performed by: INTERNAL MEDICINE

## 2017-04-11 PROCEDURE — 76040000019: Performed by: INTERNAL MEDICINE

## 2017-04-11 RX ORDER — SODIUM CHLORIDE, SODIUM LACTATE, POTASSIUM CHLORIDE, CALCIUM CHLORIDE 600; 310; 30; 20 MG/100ML; MG/100ML; MG/100ML; MG/100ML
INJECTION, SOLUTION INTRAVENOUS
Status: DISCONTINUED | OUTPATIENT
Start: 2017-04-11 | End: 2017-04-11 | Stop reason: HOSPADM

## 2017-04-11 RX ORDER — SODIUM CHLORIDE 9 MG/ML
INJECTION, SOLUTION INTRAVENOUS
Status: DISPENSED
Start: 2017-04-11 | End: 2017-04-11

## 2017-04-11 RX ORDER — PANTOPRAZOLE SODIUM 40 MG/10ML
INJECTION, POWDER, LYOPHILIZED, FOR SOLUTION INTRAVENOUS
Status: DISPENSED
Start: 2017-04-11 | End: 2017-04-11

## 2017-04-11 RX ORDER — ONDANSETRON 2 MG/ML
4 INJECTION INTRAMUSCULAR; INTRAVENOUS ONCE
Status: CANCELLED | OUTPATIENT
Start: 2017-04-11 | End: 2017-04-11

## 2017-04-11 RX ORDER — INSULIN LISPRO 100 [IU]/ML
INJECTION, SOLUTION INTRAVENOUS; SUBCUTANEOUS ONCE
Status: CANCELLED | OUTPATIENT
Start: 2017-04-11 | End: 2017-04-12

## 2017-04-11 RX ORDER — DEXTROSE 50 % IN WATER (D50W) INTRAVENOUS SYRINGE
25-50 AS NEEDED
Status: CANCELLED | OUTPATIENT
Start: 2017-04-11

## 2017-04-11 RX ORDER — HYDROMORPHONE HYDROCHLORIDE 2 MG/ML
0.5 INJECTION, SOLUTION INTRAMUSCULAR; INTRAVENOUS; SUBCUTANEOUS AS NEEDED
Status: CANCELLED | OUTPATIENT
Start: 2017-04-11

## 2017-04-11 RX ORDER — SODIUM CHLORIDE 0.9 % (FLUSH) 0.9 %
5-10 SYRINGE (ML) INJECTION AS NEEDED
Status: CANCELLED | OUTPATIENT
Start: 2017-04-11

## 2017-04-11 RX ORDER — MAGNESIUM SULFATE 100 %
4 CRYSTALS MISCELLANEOUS AS NEEDED
Status: CANCELLED | OUTPATIENT
Start: 2017-04-11

## 2017-04-11 RX ORDER — SODIUM CHLORIDE, SODIUM LACTATE, POTASSIUM CHLORIDE, CALCIUM CHLORIDE 600; 310; 30; 20 MG/100ML; MG/100ML; MG/100ML; MG/100ML
125 INJECTION, SOLUTION INTRAVENOUS CONTINUOUS
Status: CANCELLED | OUTPATIENT
Start: 2017-04-11

## 2017-04-11 RX ORDER — PROPOFOL 10 MG/ML
INJECTION, EMULSION INTRAVENOUS AS NEEDED
Status: DISCONTINUED | OUTPATIENT
Start: 2017-04-11 | End: 2017-04-11 | Stop reason: HOSPADM

## 2017-04-11 RX ORDER — METOPROLOL TARTRATE 25 MG/1
25 TABLET, FILM COATED ORAL 2 TIMES DAILY
Status: DISCONTINUED | OUTPATIENT
Start: 2017-04-11 | End: 2017-04-13 | Stop reason: HOSPADM

## 2017-04-11 RX ADMIN — PROPOFOL 150 MG: 10 INJECTION, EMULSION INTRAVENOUS at 13:45

## 2017-04-11 RX ADMIN — Medication 10 ML: at 22:00

## 2017-04-11 RX ADMIN — SODIUM CHLORIDE, SODIUM LACTATE, POTASSIUM CHLORIDE, CALCIUM CHLORIDE: 600; 310; 30; 20 INJECTION, SOLUTION INTRAVENOUS at 13:24

## 2017-04-11 RX ADMIN — METOPROLOL TARTRATE 25 MG: 25 TABLET ORAL at 21:03

## 2017-04-11 RX ADMIN — Medication 10 ML: at 16:47

## 2017-04-11 RX ADMIN — DEXTROSE MONOHYDRATE, SODIUM CHLORIDE, AND POTASSIUM CHLORIDE 150 ML/HR: 50; 4.5; 1.49 INJECTION, SOLUTION INTRAVENOUS at 01:28

## 2017-04-11 RX ADMIN — Medication 10 ML: at 06:00

## 2017-04-11 RX ADMIN — SODIUM CHLORIDE 8 MG/HR: 900 INJECTION INTRAVENOUS at 11:13

## 2017-04-11 RX ADMIN — PANTOPRAZOLE SODIUM 40 MG: 40 TABLET, DELAYED RELEASE ORAL at 08:06

## 2017-04-11 RX ADMIN — PROPOFOL 50 MG: 10 INJECTION, EMULSION INTRAVENOUS at 13:30

## 2017-04-11 NOTE — ROUTINE PROCESS
Bedside and Verbal shift change report given to Stella Osorio (oncoming nurse) by Bandar Almeida RN (offgoing nurse). Report included the following information SBAR, Kardex and MAR.

## 2017-04-11 NOTE — ROUTINE PROCESS
0830 - GI - Dr. Margaret Shi at bedside - patient for EDG today -     1230 - transported to endoscopy via stretcher     1400 - received report from [address] [place] [address]    2909 - patient back to room

## 2017-04-11 NOTE — ROUTINE PROCESS
TRANSFER - IN REPORT:    Verbal report received from Jack Bojorquez, [facility], [facility] (name) on Myrtle Sheriff  being received from 3000(unit) for ordered procedure      Report consisted of patients Situation, Background, Assessment and   Recommendations(SBAR). Information from the following report(s) SBAR was reviewed with the receiving nurse. Opportunity for questions and clarification was provided. Assessment completed upon patients arrival to unit and care assumed.

## 2017-04-11 NOTE — PROCEDURES
Endoscopy Procedure Note    Patient: Jim Ortiz MRN: [id number]  SSN: [id number]    YOB: 1940  Age: 76 y.o. Sex: female      Date/Time:  4/11/2017 1:52 PM    Esophagogastroduodenoscopy (EGD) Procedure Note    Procedure: Esophagogastroduodenoscopy with biopsy, control of bleeding    IMPRESSION:   1. Sliding hiatus hernia. 2. Erosive gastritis, antrum biopsied to rule out H pylori infection. 3. 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 RECOMMENDATIONS:  1. -Check biopsies. 2. -Continue clinical and lab monitoring. 3. -Continue PPI therapy. 4. -Clear liquids later today if stable, with no significant abdominal pain. Indication: Acute Gi bleeding in the form of melena and profound anemia. :  Alejandro Valdez MD  Referring Provider:   Kylah Milian MD  History: The history and physical exam were reviewed and updated. Endoscope: [id number]  Extent of Exam: second portion of the duodenum  ASA: ASA 2 - Patient with mild systemic disease with no functional limitations  Anethesia/Sedation:  MAC anesthesia    Description of the procedure: The procedure was discussed with the patient including risks, benefits, alternatives including risks of iv sedation, bleeding, perforation and aspiration. A safety timeout was performed. The patient was placed in the left lateral decubitus position. A bite block was placed. The patient was given incremental doses of intravenous sedation until moderate sedation was achieved. The patients vital signs were monitored at all times including heart rate/rhythm, blood pressure and oxygen saturation. The endoscope was then passed under direct visualization to the second portion of the duodenum. The endoscope was then slowly withdrawn while visualizing the mucosa. In the stomach a retroflexion was performed and gastric fundus and cardia visualized. The endoscope was then slowly withdrawn. The patient was then transferred to recovery in stable condition. Findings:    Esophagus: The esophageal mucosa without ulcers, erosions, inflammatory changes, changes of Marsh's esophagus, strictures, or mass lesions. A sliding hiatus hernia measuring 3-4 cm in length was seen. Stomach: Erosions were seen in the antrum. No ulcers, vascular or mass lesions were noted. Duodenum: The duodenum mucosa was without ulcers, erosions, inflammatory changes, or mass lesions. An actively oozing AVM was seen in the descending duodenum, and was coagulated with good hemostasis. A non-bleeding AVM was seen in the descending duodenum, and was coagulated with good hemostasis. Specimens:   ID Type Source Tests Collected by Time Destination   1 : Bx gastric r/o h pylori erosive gastritis Preservative Gastric  Sheree Ellison MD 4/11/2017 1331 Pathology                 Complications:   None; patient tolerated the procedure well.     EBL:Minimal      Sheree Ellison MD  April 11, 2017  1:52 PM

## 2017-04-11 NOTE — PROGRESS NOTES
1500: Assumed care of alert and oriented female, Pt. Drowsy from previous procedure. Resting quietly call bell within reach. 1900: Bedside shift change report given to Nando Flores RN (oncoming nurse) by Winsome Ennis RN (offgoing nurse). Report included the following information SBAR, Kardex, Procedure Summary, Intake/Output and MAR.

## 2017-04-11 NOTE — PROGRESS NOTES
*ATTENTION:  This note has been created by a medical student for educational purposes only. Please do not refer to the content of this note for clinical decision-making, billing, or other purposes. Please see attending physicians note to obtain clinical information on this patient. *       Student Progress Note  Please refer to attendings daily rounding note for full details      Patient: Nani Barber MRN: [id number]  CSN: [id number]    YOB: 1940  Age: 76 y.o. Sex: female    DOA: 4/10/2017 LOS:  LOS: 1 day          Chief Complaint:  Melena    Subjective:      Pt reports feeling much better today, no complaints. BM x1, loose, black, tarry. Denies weakness, dizziness, headache, CP, SOB, abdominal pain, nausea, vomiting, dysuria. Objective:      Visit Vitals    /69 (BP 1 Location: Right arm, BP Patient Position: At rest)    Pulse 73    Temp 98.1 °F (36.7 °C)    Resp 18    Ht 4' 11\" (1.499 m)    Wt 63.5 kg (140 lb)    SpO2 94%    BMI 28.28 kg/m2       Physical Exam:  Gen: Well developed, well nourished female, NAD  HEENT: Atruamatic, EOMI  CV: Regular rate and rhythm, no murmur appreciated  Resp: Clear to ascultation bilaterally, no increased work of breathing  Abd: BS x4, soft, nontender, nondistended  Extrem: no edema, 2+ distal pulses x4  Skin: intact, no rash  Neuro: Alert and oriented      I have reviewed the patient's Labs and Radiology studies.       Assessment:     See plan     Plan:     Melena, likely GI bleeding  · EGD today, colonoscopy tomorrow if negative  · Protonix    Anemia, likely blood loss - stable s/p PRBCs x2  · Follow H/H, transfuse prn     Hypotension - resolved s/p transfusion and IVF  · Monitor BP    DVT - SCDs  Dispo - pending      NITIN Martinez  4/11/2017  9:25 AM

## 2017-04-11 NOTE — PROGRESS NOTES
Medicine Progress Note    Patient: Martine Paige   Age:  76 y.o.  DOA: 4/10/2017   Admit Dx / CC: Melena  Hypotension  GI bleed  anemia, black stools  LOS:  LOS: 1 day     Assessment/Plan   Principal Problem:    GI bleed (4/10/2017)    Active Problems:    Melena (4/10/2017)      Hypotension (4/10/2017)        Additional Plan notes     1)  GI bleed   - Continue H&H q 12   - egd today pending   - if no significant findings then colon tomorrow. GI following   - PPI drip   - IVF      DISPO     Anticipated Date of Discharge: 4/12  Anticipated Disposition (home, SNF) : home    Subjective:   Patient seen and examined. One melenotic stool this am.    Objective:     Visit Vitals    /55 (BP 1 Location: Left arm, BP Patient Position: At rest)    Pulse 80    Temp 98.2 °F (36.8 °C)    Resp 16    Ht 4' 11\" (1.499 m)    Wt 63.5 kg (140 lb)    SpO2 92%    BMI 28.28 kg/m2       Physical Exam:  General appearance: alert, cooperative, no distress, appears stated age  Head: Normocephalic, without obvious abnormality, atraumatic  Neck: supple, trachea midline  Lungs: clear to auscultation bilaterally  Heart: regular rate and rhythm, S1, S2 normal, no murmur, click, rub or gallop  Abdomen: soft, non-tender.  Bowel sounds normal. No masses,  no organomegaly  Extremities: extremities normal, atraumatic, no cyanosis or edema  Skin: Skin color, texture, turgor normal. No rashes or lesions  Neurologic: Grossly normal    Intake and Output:  Current Shift:  04/11 0701 - 04/11 1900  In: 650 [I.V.:650]  Out: 300 [Urine:300]  Last three shifts:  04/09 1901 - 04/11 0700  In: 3030 [I.V.:1817.5]  Out: 1150 [Urine:1150]    Lab/Data Reviewed:  CMP:   Lab Results   Component Value Date/Time     04/11/2017 04:45 AM    K 4.0 04/11/2017 04:45 AM     (H) 04/11/2017 04:45 AM    CO2 19 (L) 04/11/2017 04:45 AM    AGAP 8 04/11/2017 04:45 AM     (H) 04/11/2017 04:45 AM    BUN 19 (H) 04/11/2017 04:45 AM    CREA 0.79 04/11/2017 04:45 AM    GFRAA >60 04/11/2017 04:45 AM    GFRNA >60 04/11/2017 04:45 AM    CA 7.4 (L) 04/11/2017 04:45 AM     CBC:   Lab Results   Component Value Date/Time    WBC 5.2 04/11/2017 04:45 AM    HGB 8.5 (L) 04/11/2017 09:20 AM    HCT 24.9 (L) 04/11/2017 09:20 AM     04/11/2017 04:45 AM     All Cardiac Markers in the last 24 hours: No results found for: CPK, CKMMB, CKMB, RCK3, CKMBT, CKNDX, CKND1, AZIZA, TROPT, TROIQ, MARIUSZ, TROPT, TNIPOC, BNP, BNPP    Medications Reviewed:  Current Facility-Administered Medications   Medication Dose Route Frequency    pantoprazole (PROTONIX) 40 mg in 0.9% sodium chloride (MBP/ADV) 50 mL MBP  8 mg/hr IntraVENous CONTINUOUS    pantoprazole (PROTONIX) 40 mg injection        0.9% sodium chloride infusion        sodium chloride (NS) flush 5-10 mL  5-10 mL IntraVENous Q8H    sodium chloride (NS) flush 5-10 mL  5-10 mL IntraVENous PRN    0.9% sodium chloride infusion 250 mL  250 mL IntraVENous PRN    raloxifene (EVISTA) tablet 60 mg (Patient's Own Medication)  60 mg Oral DAILY    dextrose 5% - 0.45% NaCl with KCl 20 mEq/L infusion  75 mL/hr IntraVENous CONTINUOUS       Owen Oden MD    April 11, 2017

## 2017-04-11 NOTE — ANESTHESIA POSTPROCEDURE EVALUATION
Post-Anesthesia Evaluation and Assessment    Patient: Jerica Christiansen MRN: [id number]  SSN: [id number]    YOB: 1940  Age: 76 y.o. Sex: female       Cardiovascular Function/Vital Signs  Visit Vitals    /62    Pulse 89    Temp 36.8 °C (98.2 °F)    Resp 17    Ht 4' 11\" (1.499 m)    Wt 63.5 kg (140 lb)    SpO2 96%    BMI 28.28 kg/m2       Patient is status post MAC anesthesia for Procedure(s):  ESOPHAGOGASTRODUODENOSCOPY (EGD). Nausea/Vomiting: None    Postoperative hydration reviewed and adequate. Pain:  Pain Scale 1: Visual (04/11/17 0639)  Pain Intensity 1: 0 (04/11/17 7896)   Managed    Neurological Status: At baseline    Mental Status and Level of Consciousness: Arousable    Pulmonary Status:   O2 Device: Room air (04/11/17 1346)   Adequate oxygenation and airway patent    Complications related to anesthesia: None    Post-anesthesia assessment completed.  No concerns    Signed By: Baker Seip, CRNA     April 11, 2017

## 2017-04-11 NOTE — PROGRESS NOTES
[facility]   Discharge Planning/ Assessment    Reasons for Intervention: Chart reviewed and pt verified info except NOK  Is Shabana Castano, son who lives with her. [phone]. Independent without HH or DME, might need transportation home. Plan is home when medically stable.      High Risk Criteria  [] Yes  [x]No   Physician Referral  [] Yes  [x]No        Date    Nursing Referral  [] Yes  [x]No        Date    Patient/Family Request  [] Yes  [x]No        Date       Resources:    Medicare  [x] Yes  []No   Medicaid  [] Yes  [x]No   No Resources  [] Yes  [x]No   Private Insurance  [] Yes  [x]No    Name/Phone Number    Other  [] Yes  [x]No        (i.e. Workman's Comp)         Prior Services:    Prior Services  [] Yes  [x]No   Home Health  [] Yes  [x]No   [address]  [] Yes  [x]No        [facility] of [address] Program  [] Yes  [x]No       Meals on Wheels  [] Yes  [x]No   Office on Aging  [] Yes  [x]No   Transportation Services  [] Yes  [x]No   Nursing Home  [] Yes  [x]No        Nursing Home Name    [address]  [] Yes  [x]No        [address] Name    Other       Information Source:      Information obtained from  [x] Patient  [] Parent   [] [address]  [] Child  [] Spouse   [] Significant Other/Partner   [] Friend      [] EMS    [] Nursing Home Chart          [] Other:   Chart Review  [x] Yes  []No     Family/Support System:    Patient lives with  [] Alone    [] Spouse   [] Significant Other  [x] Children  [] Caretaker   [] Parent  [] Sibling     [] Other       Other Support System:    Is the patient responsible for care of others  [] Yes  [x]No   Information of person caring for patient on  discharge    Managers financial affairs independently  [x] Yes  []No   If no, explain:      Status Prior to Admission:    Mental Status  [x] Awake  [] Alert  [] Oriented  [] Quiet/Calm [] Lethargic/Sedated   [] Disoriented  [] Restless/Anxious  [] Combative   Personal Care  [] Dependent  [x] Independent Personal Care  [] Requires Assistance   Meal Preparation Ability  [x] Independent   [] Standby Assistance   [] Minimal Assistance   [] Moderate Assistance  [] Maximum Assistance     [] Total Assistance   Chores  [x] Independent with Chores   [] N/A Nursing Home Resident   [] Requires Assistance   Bowel/Bladder  [x] Continent  [] Catheter  [] Incontinent  [] Ostomy Self-Care    [] Urine Diversion Self-Care  [] Maximum Assistance     [] Total Assistance   Number of Persons needed for assistance    DME at home  [] Dirk Bowl, Ladell Li  [] Dirk Bowl, Straight   [] Commode    [] Bathroom/Grab Bars  [] Hospital Bed  [] Nebulizer  [] Oxygen           [] Raised Toilet Seat  [] Shower Chair  [] Side Rails for Bed   [] Tub Transfer Bench   [] Towana Prey  [] Gokul Townsend      [] Other:   Vendor      Treatment Presently Receiving:    Current Treatments  [] Chemotherapy  [] Dialysis  [] Insulin  [] IVAB [x] IVF   [] O2  [] PCA   [] PT   [] RT   [] Tube Feedings   [] Wound Care     Psychosocial Evaluation:    Verbalized Knowledge of Disease Process  [] Patient  []Family   Coping with Disease Process  [] Patient  []Family   Requires Further Counseling Coping with Disease Process  [] Patient  []Family     Identified Projected Needs:    Home Health Aid  [] Yes  [x]No   Transportation  [] Yes  [x]No   Education  [] Yes  [x]No        Specific Education     Financial Counseling  [] Yes  [x]No   Inability to Care for Self/Will Require 24 hour care  [] Yes  [x]No   Pain Management  [] Yes  [x]No   Home Infusion Therapy  [] Yes  [x]No   Oxygen Therapy  [] Yes  [x]No   DME  [] Yes  [x]No   Long Term Care Placement  [] Yes  [x]No   Rehab  [] Yes  [x]No   Physical Therapy  [] Yes  [x]No   Needs Anticipated At This Time  [] Yes  [x]No     Intra-Hospital Referral:    [facility]  [] Yes  [x]No     [] Yes  [x]No   Patient Representative  [] Yes  [x]No   Staff for Teaching Needs  [] Yes  [x]No Specialty Teaching Needs     Diabetic Educator  [] Yes  [x]No   Referral for Diabetic Educator Needed  [] Yes  [x]No  If Yes, place order for Nutritionist or Diabetic Consult     Tentative Discharge Plan:    Home with No Services  [x] Yes  []No   Home with [address]  [] Yes  [x]No        If Yes, specify type    Home Care Program  [] Yes  [x]No        If Yes, specify type    Meals on Wheels  [] Yes  [x]No   Office of Aging  [] Yes  [x]No   NHP  [] Yes  [x]No   Return to the Nursing Home  [] Yes  [x]No   Rehab Therapy  [] Yes  [x]No   Acute Rehab  [] Yes  [x]No   Subacute Rehab  [] Yes  [x]No   Private Care  [] Yes  [x]No   Substance Abuse Referral  [] Yes  [x]No   Transportation  [] Yes  [x]No   Chore Service  [] Yes  [x]No   Inpatient Hospice  [] Yes  [x]No   OP RT  [] Yes  [x] No   OP Hemo  [] Yes  [x] No   OP PT  [] Yes  [x]No   Support Group  [] Yes  [x]No   Reach to Recovery  [] Yes  [x]No   OP Oncology Clinic  [] Yes  [x]No   Clinic Appointment  [] Yes  [x]No   DME  [] Yes  [x]No   Comments    Name of D/C Planner or  Given to Patient or Family Klever Prieto RN   Phone Number         Extension [phone]   Date 04/11/17   Time    If you are discharged home, whom do you designate to participate in your discharge plan and receive any information needed?      Enter name of designee son        Phone # of designee         Address of designee         Updated         Patient refused to designate any           individual

## 2017-04-11 NOTE — ROUTINE PROCESS
0800 - assumed care of patient - alert and oriented. No complaints of pain - reports BM this morning that was black tarry stool.    Continues NPO

## 2017-04-11 NOTE — ACP (ADVANCE CARE PLANNING)
Patient has designated _________no one_______________ to participate in his/her discharge plan and to receive any needed information.      Name:   Address:  Phone number:   no one

## 2017-04-11 NOTE — MED STUDENT NOTES
Subjective:  Patient was seen and examined. She has no complaints. LLQ pain is 0/10. She has had no BM since admission. No nausea or vomiting, dizziness or weakness. She feels hungry. Objective:  Visit Vitals    /69 (BP 1 Location: Right arm, BP Patient Position: At rest)    Pulse 73    Temp 98.1 °F (36.7 °C)    Resp 18    Ht 4' 11\" (1.499 m)    Wt 140 lb (63.5 kg)    SpO2 94%    BMI 28.28 kg/m2   General: Alert and oriented. In no apparent distress. Laying down in bed watching television  Cardiac: RRR with no extra sounds, murmurs, gallops, or rubs  Lungs: CTA bilaterally with no wheezes, crackles, or rhonchi  GI: Normoactive bowel sounds. Soft, nontender to palpation in all quadrants with no masses or organomegaly. No rebound tenderness or guarding. Assessment:  GI bleeding  Melena  Hypotension  LYNN    Plan:  GI bleeding/Melena - continue Protonix. EGD today. Possible colonoscopy tomorrow per GI  Hypotension - /69, received 2 units PRBC and IV fluids   LYNN -  Initial Hgb 6.8. Improving Hgb 8.1. Monitor H/H    *ATTENTION:  This note has been created by a medical student for educational purposes only. Please do not refer to the content of this note for clinical decision-making, billing, or other purposes. Please see attending physicians note to obtain clinical information on this patient. *

## 2017-04-11 NOTE — PROGRESS NOTES
1935-Received Bedside Report from CADEN JODI DONATO [facility]. Pt in bed and voiced no concern. Instructed pt to call for assistance prior to getting out bed, call bell within reach with bed in low position. 2056-monitor Tech notified RN of pts Runs of SVT     2100-Shift assessment completed. Metoprolol given for SVT. Will continue to monitor    2300-Heart rate within normal range. 0155-IVF hung    0555-pt resting quietly in bed. Iv protonix hung. Call bell within reach      -Bedside and Verbal shift change report given to CADEN JODI DONATO [facility] (oncoming nurse) by Priya Neal RN (offgoing nurse). Report included the following information SBAR, Kardex, Intake/Output, MAR and Recent Results.

## 2017-04-11 NOTE — ROUTINE PROCESS
TRANSFER - OUT REPORT:    Verbal report given to Roma Zafar on Sybil Drilling  being transferred to [facility] for ordered procedure       Report consisted of patients Situation, Background, Assessment and   Recommendations(SBAR). Information from the following report(s) Procedure Summary was reviewed with the receiving nurse. Lines:   Peripheral IV 04/11/17 Right Wrist (Active)        Opportunity for questions and clarification was provided.       Patient transported with:   Registered Nurse

## 2017-04-11 NOTE — CONSULTS
Consult Note    Patient: Omi Ashton               Sex: female          DOA: 4/10/2017         YOB: 1940      Age:  76 y.o.        LOS:  LOS: 1 day              HPI:     Omi Ashton is a 76 y.o. female who presented to the ER yesterday reporting a one day hx of recurrent episodes of melena. She had five or six loose/liquid, black, tarry bowel movements starting on Sunday afternoon until Monday morning. She had associated LLQ abdominal cramping, that seemed to resolve after the bowel movement started. The patient denied upper abdominal pain, nausea or vomiting, anorexia or weight loss, heartburn, or dysphagia. She noted some orthostatic dizziness and weakness. In the ER she was initially hypotensive but responded promptly to IV fluids. Initial Hg was 6.8 gm/dl. She was given two units of blood. This morning she voices no complaints. There is no hx of NSAID use or peptic ulcer disease. Her PTA med list includes esomeprazole that she states she has not taken in a long time. She rarely has heartburn as long as she watches her diet. She is S/P lap alesha in 2008. She had a colonoscopy in 2007 that showed diverticulosis and hemorrhoids but no neoplastic lesions. She has a hx of PVD with previous stenting of the iliacs in 2008. She has been maintained on ASA 81 mg a day since. There is no prior hx of heart disease, or cerebrovascular disease. Past Medical History:   Diagnosis Date    Follow-up exam     Hypertension     Menopause     Thyroid disease        Past Surgical History:   Procedure Laterality Date    HX CHOLECYSTECTOMY  3/8/2010    HX OTHER SURGICAL      stints in stomach       History reviewed. No pertinent family history.     Social History     Social History    Marital status:      Spouse name: N/A    Number of children: N/A    Years of education: N/A     Social History Main Topics    Smoking status: Never Smoker    Smokeless tobacco: None    Alcohol use No    Drug use: None    Sexual activity: Not Asked     Other Topics Concern    None     Social History Narrative       Prior to Admission medications    Medication Sig Start Date End Date Taking? Authorizing Provider   diltiazem XR (DILACOR XR) 120 mg XR capsule Take  by mouth daily. Yes Historical Provider   metoprolol (LOPRESSOR) 25 mg tablet Take  by mouth two (2) times a day. Yes Historical Provider   raloxifene (EVISTA) 60 mg tablet Take  by mouth daily. Yes Historical Provider   lisinopril (PRINIVIL, ZESTRIL) 20 mg tablet Take  by mouth daily. Yes Historical Provider   LEVOTHYROXINE SODIUM (SYNTHROID PO) Take  by mouth. Yes Historical Provider   esomeprazole (NEXIUM) 40 mg capsule Take  by mouth daily. Historical Provider       No Known Allergies    Review of Systems  A comprehensive review of systems was negative except for that written in the History of Present Illness.       Physical Exam:      Visit Vitals    /69 (BP 1 Location: Right arm, BP Patient Position: At rest)    Pulse 73    Temp 98.1 °F (36.7 °C)    Resp 18    Ht 4' 11\" (1.499 m)    Wt 63.5 kg (140 lb)    SpO2 94%    BMI 28.28 kg/m2       Physical Exam:  Constitutional: negative  Eyes: negative  Ears, nose, mouth, throat, and face: negative  Respiratory: negative  Cardiovascular: negative  Gastrointestinal: soft, non-tender, no organomegaly or masses  Genitourinary:negative  Integument/breast: negative  Hematologic/lymphatic: negative  Musculoskeletal:negative  Neurological: negative    Labs Reviewed:  CMP:   Lab Results   Component Value Date/Time     04/11/2017 04:45 AM    K 4.0 04/11/2017 04:45 AM     (H) 04/11/2017 04:45 AM    CO2 19 (L) 04/11/2017 04:45 AM    AGAP 8 04/11/2017 04:45 AM     (H) 04/11/2017 04:45 AM    BUN 19 (H) 04/11/2017 04:45 AM    CREA 0.79 04/11/2017 04:45 AM    GFRAA >60 04/11/2017 04:45 AM    GFRNA >60 04/11/2017 04:45 AM    CA 7.4 (L) 04/11/2017 04:45 AM    ALB 2.8 (L) 04/10/2017 08:45 AM    TP 5.7 (L) 04/10/2017 08:45 AM    GLOB 2.9 04/10/2017 08:45 AM    AGRAT 1.0 04/10/2017 08:45 AM    SGOT 26 04/10/2017 08:45 AM    ALT 21 04/10/2017 08:45 AM     CBC:   Lab Results   Component Value Date/Time    WBC 5.2 04/11/2017 04:45 AM    HGB 8.1 (L) 04/11/2017 04:45 AM    HCT 23.9 (L) 04/11/2017 04:45 AM     04/11/2017 04:45 AM     COAGS:   Lab Results   Component Value Date/Time    APTT 29.0 04/10/2017 08:45 AM    PTP 12.9 04/10/2017 08:45 AM    INR 1.0 04/10/2017 08:45 AM       Imaging:  CT scan      1. No acute intra-abdominal process is identified. Mild colonic diverticulosis  without CT evidence of acute diverticulitis.     2. Interval cholecystectomy. No abnormal biliary dilatation.     3. Interval enlargement of left renal lower pole hypodense simple appearing  renal cyst. Simple cysts can increase in size over time. Suggest follow-up renal  ultrasound 3-6 months.     4. Stable bibasilar scarring versus atelectasis. Stable subpleural right middle  lobe nodule, very likely benign given the lengthy stability since 2/18/2010.               Assessment/Plan     Principal Problem:    GI bleed (4/10/2017)    Active Problems:    Melena (4/10/2017)      Hypotension (4/10/2017)      Fransisca Lancaster is a 77 yo woman with a hx of acute GI bleeding in the form of melena, hypotension and profound anemia. This may be secondary to an upper GI source of bleeding. A lower Gi source is less likely but not excluded. The differential dx includes acid peptic disease, Blair ulcers, MW tear, vascular, inflammatory or neoplastic lesions. She is hemodynamically stable at this time. Continue Pantoprazole drip. Continue clinical and lab monitoring. EGD today. If negative, colonoscopy tomorrow.      Ryland Garcia MD.

## 2017-04-11 NOTE — CDMP QUERY
Please clarify if this patient is being treated/managed for:    =>ACUTE BLOOD LOSS ANEMIA    =>Other Explanation of clinical findings  =>Unable to Determine (no explanation of clinical findings)    The medical record reflects the following:    Risk: 77 yo female with PMH HTN, thyroid disease, admitted with GI bleed    Clinical Indicators: h&h 6.8/20.7 in ED, BP decreased 90/36 82/41    Treatment: Transfusion 2 units PRBC, IV fluids    Please clarify and document your clinical opinion in the progress notes and discharge summary including the definitive and/or presumptive diagnosis, (suspected or probable), related to the above clinical findings. Please include clinical findings supporting your diagnosis. If you DECLINE this query or would like to communicate with [employer], please utilize the \"[employer] message box\" at the TOP of the Progress Note on the right.       Thank you,  Malini [address] [facility]  [phone]

## 2017-04-11 NOTE — ANESTHESIA PREPROCEDURE EVALUATION
Anesthetic History   No history of anesthetic complications            Review of Systems / Medical History  Patient summary reviewed, nursing notes reviewed and pertinent labs reviewed    Pulmonary  Within defined limits                 Neuro/Psych   Within defined limits           Cardiovascular    Hypertension: well controlled                   GI/Hepatic/Renal  Within defined limits              Endo/Other      Hypothyroidism       Other Findings   Comments:   Risk Factors for Postoperative nausea/vomiting:       History of postoperative nausea/vomiting? NO       Female? YES       Motion sickness? NO       Intended opioid administration for postoperative analgesia?   NO           Physical Exam    Airway  Mallampati: I  TM Distance: 4 - 6 cm  Neck ROM: normal range of motion   Mouth opening: Normal     Cardiovascular    Rhythm: regular  Rate: normal         Dental    Dentition: Edentulous     Pulmonary  Breath sounds clear to auscultation               Abdominal  GI exam deferred       Other Findings            Anesthetic Plan    ASA: 2  Anesthesia type: MAC          Induction: Intravenous  Anesthetic plan and risks discussed with: Patient

## 2017-04-12 LAB
HGB BLD-MCNC: 10.2 G/DL (ref 12–16)
HGB BLD-MCNC: 7.8 G/DL (ref 12–16)
HGB BLD-MCNC: 8.4 G/DL (ref 12–16)

## 2017-04-12 PROCEDURE — 74011250636 HC RX REV CODE- 250/636: Performed by: HOSPITALIST

## 2017-04-12 PROCEDURE — 74011000258 HC RX REV CODE- 258: Performed by: HOSPITALIST

## 2017-04-12 PROCEDURE — 85018 HEMOGLOBIN: CPT | Performed by: HOSPITALIST

## 2017-04-12 PROCEDURE — 65660000000 HC RM CCU STEPDOWN

## 2017-04-12 PROCEDURE — 36430 TRANSFUSION BLD/BLD COMPNT: CPT

## 2017-04-12 PROCEDURE — 74011250637 HC RX REV CODE- 250/637: Performed by: HOSPITALIST

## 2017-04-12 PROCEDURE — C9113 INJ PANTOPRAZOLE SODIUM, VIA: HCPCS | Performed by: HOSPITALIST

## 2017-04-12 PROCEDURE — P9016 RBC LEUKOCYTES REDUCED: HCPCS | Performed by: EMERGENCY MEDICINE

## 2017-04-12 PROCEDURE — 36415 COLL VENOUS BLD VENIPUNCTURE: CPT | Performed by: HOSPITALIST

## 2017-04-12 RX ORDER — SODIUM CHLORIDE 9 MG/ML
250 INJECTION, SOLUTION INTRAVENOUS AS NEEDED
Status: DISCONTINUED | OUTPATIENT
Start: 2017-04-12 | End: 2017-04-13 | Stop reason: HOSPADM

## 2017-04-12 RX ORDER — HYDRALAZINE HYDROCHLORIDE 20 MG/ML
10 INJECTION INTRAMUSCULAR; INTRAVENOUS
Status: DISCONTINUED | OUTPATIENT
Start: 2017-04-12 | End: 2017-04-13 | Stop reason: HOSPADM

## 2017-04-12 RX ORDER — AMLODIPINE BESYLATE 10 MG/1
10 TABLET ORAL DAILY
Status: DISCONTINUED | OUTPATIENT
Start: 2017-04-12 | End: 2017-04-13 | Stop reason: HOSPADM

## 2017-04-12 RX ORDER — PANTOPRAZOLE SODIUM 40 MG/1
40 TABLET, DELAYED RELEASE ORAL
Status: DISCONTINUED | OUTPATIENT
Start: 2017-04-13 | End: 2017-04-13 | Stop reason: HOSPADM

## 2017-04-12 RX ADMIN — Medication 10 ML: at 22:00

## 2017-04-12 RX ADMIN — DEXTROSE MONOHYDRATE, SODIUM CHLORIDE, AND POTASSIUM CHLORIDE 75 ML/HR: 50; 4.5; 1.49 INJECTION, SOLUTION INTRAVENOUS at 01:44

## 2017-04-12 RX ADMIN — AMLODIPINE BESYLATE 10 MG: 10 TABLET ORAL at 11:52

## 2017-04-12 RX ADMIN — METOPROLOL TARTRATE 25 MG: 25 TABLET ORAL at 19:04

## 2017-04-12 RX ADMIN — Medication 10 ML: at 06:00

## 2017-04-12 RX ADMIN — IRON SUCROSE 300 MG: 20 INJECTION, SOLUTION INTRAVENOUS at 19:10

## 2017-04-12 RX ADMIN — SODIUM CHLORIDE 8 MG/HR: 900 INJECTION INTRAVENOUS at 01:47

## 2017-04-12 RX ADMIN — METOPROLOL TARTRATE 25 MG: 25 TABLET ORAL at 08:50

## 2017-04-12 RX ADMIN — SODIUM CHLORIDE 8 MG/HR: 900 INJECTION INTRAVENOUS at 05:52

## 2017-04-12 RX ADMIN — Medication 10 ML: at 19:04

## 2017-04-12 NOTE — MED STUDENT NOTES
Subjective:  Patient seen and examined. No complaints today. Denies dizziness or lightheadedness. No abdominal pain, nausea, or vomiting. She had a bowel movement this morning which was \"half brown and black. \"    Objective:  Visit Vitals    /66 (BP 1 Location: Left arm)    Pulse 71    Temp 98.1 °F (36.7 °C)    Resp 18    Ht 4' 11\" (1.499 m)    Wt 141 lb (64 kg)    SpO2 92%    BMI 28.48 kg/m2   General: alert and oriented. In no apparent distress. Laying down in bed watching television  Cardiac: RRR with no extra sounds, murmurs, gallops, or rubs  Lungs: CTA bilaterally with no wheezes, crackles, or rhonchi  Abdomen: Bowel sounds normoactive. Soft, nontender to palpation in all quadrants with no masses or organomegaly. No rebound tenderness or guarding. 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 Antrum biopsied to rule out H. Pylori infection. -Melena  -LYNN - Hgb 7.8    Plan:  -PRBC transfusion for LYNN  -Check antral biopsy for H. Pylori  -Continue PPI  -Upgrade to clear liquid diet today  -Monitor H&H, plan to discharge tomorrow if stable after transfusion    *ATTENTION:  This note has been created by a medical student for educational purposes only. Please do not refer to the content of this note for clinical decision-making, billing, or other purposes. Please see attending physicians note to obtain clinical information on this patient. *

## 2017-04-12 NOTE — PROGRESS NOTES
0700: Bedside shift change report given to Maria Dolores Emery RN (oncoming nurse) by Pinky Ledesma RN (offgoing nurse). Report included the following information SBAR, Kardex and Procedure Summary. 1100: Pt. Needs 1 unit blood. 1430: 1 unit blood complete. 1900: Bedside shift change report given to Jahaira Soler RN (oncoming nurse) by Maria Dolores Emery RN (offgoing nurse). Report included the following information SBAR, Kardex, Procedure Summary, Intake/Output and MAR.

## 2017-04-12 NOTE — PROGRESS NOTES
Medicine Progress Note    Patient: Elizabeth Linton   Age:  76 y.o.  DOA: 4/10/2017   Admit Dx / CC: Melena  Hypotension  GI bleed  anemia, black stools  LOS:  LOS: 2 days     Assessment/Plan   Principal Problem:    GI bleed (4/10/2017)    Active Problems:    Melena (4/10/2017)      Hypotension (4/10/2017)        Additional Plan notes     1)  GI bleed   - Continue H&H q 12   - egd with bleeding avms duodenal, plan on monitoring today and dc tomorrow if stable Hb   -P0 ppi   - 1 U blood    2)  HTN   - start norvasc, restart metoprolol    DISPO     Anticipated Date of Discharge: 4/13  Anticipated Disposition (home, SNF) : home    Subjective:   Patient seen and examined. No complaints today    Objective:     Visit Vitals    /57    Pulse 76    Temp 98.3 °F (36.8 °C)    Resp 18    Ht 4' 11\" (1.499 m)    Wt 64 kg (141 lb)    SpO2 98%    BMI 28.48 kg/m2       Physical Exam:  General appearance: alert, cooperative, no distress, appears stated age  Head: Normocephalic, without obvious abnormality, atraumatic  Neck: supple, trachea midline  Lungs: clear to auscultation bilaterally  Heart: regular rate and rhythm, S1, S2 normal, no murmur, click, rub or gallop  Abdomen: soft, non-tender. Bowel sounds normal. No masses,  no organomegaly  Extremities: extremities normal, atraumatic, no cyanosis or edema  Skin: Skin color, texture, turgor normal. No rashes or lesions  Neurologic: Grossly normal    Intake and Output:  Current Shift:     Last three shifts:  04/10 1901 - 04/12 0700  In: 5087.5 [P.O.:100;  I.V.:3775]  Out: 1300 [Urine:1300]    Lab/Data Reviewed:  CMP:   No results found for: NA, K, CL, CO2, AGAP, GLU, BUN, CREA, GFRAA, GFRNA, CA, MG, PHOS, ALB, TBIL, TP, ALB, GLOB, AGRAT, SGOT, ALT, GPT  CBC:   Lab Results   Component Value Date/Time    HGB 7.8 (L) 04/12/2017 05:16 AM     All Cardiac Markers in the last 24 hours: No results found for: CPK, CKMMB, CKMB, RCK3, CKMBT, CKNDX, CKND1, AZIZA, TROPT, TROIQ, MARIUSZ, TROPT, TNIPOC, BNP, BNPP    Medications Reviewed:  Current Facility-Administered Medications   Medication Dose Route Frequency    0.9% sodium chloride infusion 250 mL  250 mL IntraVENous PRN    iron sucrose (VENOFER) 300 mg in 0.9% sodium chloride 100 mL IVPB  300 mg IntraVENous ONCE    [START ON 4/13/2017] pantoprazole (PROTONIX) tablet 40 mg  40 mg Oral ACB    metoprolol tartrate (LOPRESSOR) tablet 25 mg  25 mg Oral BID    sodium chloride (NS) flush 5-10 mL  5-10 mL IntraVENous Q8H    sodium chloride (NS) flush 5-10 mL  5-10 mL IntraVENous PRN    0.9% sodium chloride infusion 250 mL  250 mL IntraVENous PRN    raloxifene (EVISTA) tablet 60 mg (Patient's Own Medication)  60 mg Oral DAILY       Schuyler Blank MD    April 12, 2017

## 2017-04-12 NOTE — PROGRESS NOTES
Nutrition initial assessment/  Plan of care      RECOMMENDATIONS:     1. Clear liquids; Advance diet when medically indicated/as tolerated  2. Monitor weight, labs and PO intake  3. RD to follow     GOALS:     1. PO intake meets >75% of protein/calorie needs by 4/17  2. Weight Maintenance (+/- 1-2 lb by 4/17)     ASSESSMENT:     Weight status is classified as overweight per BMI of 28.5. Currently not meeting nutrition needs due to clear liquid diet order. Labs noted. Nutrition recommendations listed. RD to follow. Nutrition Diagnoses: Altered GI function related to GI bleed as evidenced by clear liquid diet order. Nutrition Risk:  [] High  [x] Moderate []  Low    SUBJECTIVE/OBJECTIVE:      Patient admitted with GI bleed. Denies food allergies and problems with chewing/swallowing. Patient reports eating well PTA and weight has been stable around 140 lb. Tolerating clear liquids. Denies GI complaints. Will monitor diet advancement. Information Obtained from:    [x] Chart Review   [x] Patient   [] Family/Caregiver   [] Nurse/Physician   [] Interdisciplinary Meeting/Rounds    Diet:Clear liquids  Medications: [x] Reviewed    Allergies: [x] Reviewed   Encounter Diagnoses     ICD-10-CM ICD-9-CM   1. Rectal bleeding K62.5 569.3   2. Abdominal pain, LLQ (left lower quadrant) R10.32 789.04   3. Transient hypotension I95.9 796.3   4.  Anemia, unspecified type D64.9 285.9     Past Medical History:   Diagnosis Date    Follow-up exam     Hypertension     Menopause     Thyroid disease       Labs:  Lab Results   Component Value Date/Time    Sodium 143 04/11/2017 04:45 AM    Potassium 4.0 04/11/2017 04:45 AM    Chloride 116 04/11/2017 04:45 AM    CO2 19 04/11/2017 04:45 AM    Anion gap 8 04/11/2017 04:45 AM    Glucose 111 04/11/2017 04:45 AM    BUN 19 04/11/2017 04:45 AM    Creatinine 0.79 04/11/2017 04:45 AM    Calcium 7.4 04/11/2017 04:45 AM    Albumin 2.8 04/10/2017 08:45 AM     Anthropometrics: BMI (calculated): 28.5  Last 3 Recorded Weights in this Encounter    04/10/17 0816 04/12/17 0638   Weight: 63.5 kg (140 lb) 64 kg (141 lb)    Ht Readings from Last 1 Encounters:   04/12/17 4' 11\" (1.499 m)     Patient Vitals for the past 100 hrs:   % Diet Eaten   04/11/17 1008 0 %     IBW: 95 lb %IBW: 148% UBW: 140 lb %UBW: 101%   [] Weight Loss [] Weight Gain [x] Weight Stable    Estimated Nutrition Needs: [x] MSJ  [] Other:  Calories: 1330 Kcal Based on:   [x] Actual BW    Protein:   64-77 g Based on:   [x] Actual BW    Fluid:      4292-1312 ml Based on:   [x] Actual BW      [x] No Cultural, Shinto or ethnic dietary need identified.     [] Cultural, Shinto and ethnic food preferences identified and addressed     Wt Status:  [] Normal (18.6 - 24.9) [] Underweight (<18.5) [x] Overweight (25 - 29.9) [] Mild Obesity (30 - 34.9)  [] Moderate Obesity (35 - 39.9) [] Morbid Obesity (40+)     Nutrition Problems Identified:   [x] Suboptimal PO intake vs clear liquids   [] Food Allergies  [] Difficulty chewing/swallowing/poor dentition  [] Constipation/Diarrhea   [] Nausea/Vomiting   [] None  [] Other:     Plan:   [x] Therapeutic Diet  []  Obtained/adjusted food preferences/tolerances and/or snacks options   []  Supplements added   [] Occupational therapy following for feeding techniques  []  HS snack added   []  Modify diet texture   []  Modify diet for food allergies   []  Assist with menu selection   [x]  Monitor PO intake on meal rounds   [x]  Continue inpatient monitoring and intervention   []  Participated in discharge planning/Interdisciplinary rounds/Team meetings   []  Other:     Education Needs:   [] Not appropriate for teaching at this time due to:   [x] Identified and addressed    Nutrition Monitoring and Evaluation:  [x] Continue ongoing monitoring and intervention  [] Sarai Molina

## 2017-04-12 NOTE — PROGRESS NOTES
*ATTENTION:  This note has been created by a medical student for educational purposes only. Please do not refer to the content of this note for clinical decision-making, billing, or other purposes. Please see attending physicians note to obtain clinical information on this patient. *       Student Progress Note  Please refer to attendings daily rounding note for full details      Patient: Elizabeth Linton MRN: [id number]  CSN: [id number]    YOB: 1940  Age: 76 y.o. Sex: female    DOA: 4/10/2017 LOS:  LOS: 2 days          Chief Complaint:  GI bleeding     Subjective:      Pt without complaints. BM was more brown yesterday. Denies headache, dizziness, weakness, CP, palpitations, SOB, abdominal pain, nausea, vomiting. Objective:      Visit Vitals    /66 (BP 1 Location: Left arm)    Pulse 71    Temp 98.1 °F (36.7 °C)    Resp 18    Ht 4' 11\" (1.499 m)    Wt 64 kg (141 lb)    SpO2 92%    BMI 28.48 kg/m2       Physical Exam:  Gen: Well developed, well nourished female, NAD  HEENT: Atruamatic, EOMI  CV: Regular rate and rhythm, no murmur appreciated  Resp: Clear to ascultation bilaterally, no increased work of breathing  Abd: BS present, soft, nontender, nondistended  Extrem: no edema, 2+ distal pulses x4  Skin: intact, no rash  Neuro: Alert and oriented      I have reviewed the patient's Labs and Radiology studies.       Assessment:     See plan     Plan:     GI bleeding  · S/p EGD and coagulation of bleeding duodenal AVM  · Protonix, follow biopsies, advance diet as tolerated    Blood loss anemia   · S/p PRBCs x3  · Follow H/H     HTN - continue home meds    DVT - SCDs  Dispo - home pending stable H/H      NITIN Beckett  4/12/2017  8:58 AM

## 2017-04-12 NOTE — PROGRESS NOTES
PROGRESS NOTE   PATIENT:  Yuni Porter           MRN: [id number]           [facility], [birth year]013[facility]           4/12/2017, 9:02 AM      I      SUBJECTIVE:  No abdominal pain, nausea or vomiting. Stools more brown, less black this am.     OBJECTIVE:  Patient Vitals for the past 24 hrs:   Temp Pulse Resp BP SpO2   04/12/17 0638 98.1 °F (36.7 °C) 71 18 160/66 92 %   04/12/17 0210 97.6 °F (36.4 °C) 78 18 169/66 90 %   04/11/17 2202 98 °F (36.7 °C) 81 18 174/68 92 %   04/11/17 2102 98.4 °F (36.9 °C) 83 18 181/59 96 %   04/11/17 1800 98.7 °F (37.1 °C) 84 16 180/64 95 %   04/11/17 1505 98.3 °F (36.8 °C) 75 18 158/66 95 %   04/11/17 1350 - 80 16 140/55 92 %   04/11/17 1346 - 89 17 149/62 96 %   04/11/17 1008 98.2 °F (36.8 °C) 80 18 107/68 96 %         Intake/Output Summary (Last 24 hours) at 04/12/17 0902  Last data filed at 04/12/17 0640   Gross per 24 hour   Intake           2057.5 ml   Output              200 ml   Net           1857.5 ml       Gen: NAD  Lungs: Clear B/L   CVS exam: Regular rate and rhythm   Abd  : Soft, non tender, BS +, No masses felt. Labs: Results:   Chemistry Recent Labs      04/11/17   0445  04/10/17   0845   GLU  111*  134*   NA  143  137   K  4.0  3.6   CL  116*  104   CO2  19*  21   BUN  19*  23*   CREA  0.79  1.07   CA  7.4*  7.9*   AGAP  8  12   BUCR  24*  21*   AP   --   51   TP   --   5.7*   ALB   --   2.8*   GLOB   --   2.9   AGRAT   --   1.0    Estimated Creatinine Clearance: 58.3 mL/min (based on Cr of 0.79).    CBC w/Diff Recent Labs      04/12/17   0516  04/11/17   1540  04/11/17   0920  04/11/17   0445  04/11/17   0025   04/10/17   0845   WBC   --    --    --   5.2   --    --   5.3   RBC   --    --    --   2.74*   --    --   2.35*   HGB  7.8*  8.9*  8.5*  8.1*  8.6*   < >  6.8*   HCT   --    --   24.9*  23.9*  25.9*   < >  20.7*   PLT   --    --    --   177   --    --   250   GRANS   --    --    --   57   --    --   63   LYMPH   --    --    --   30   --    --   25   EOS --    --    --   0   --    --   0    < > = values in this interval not displayed. Cardiac Enzymes Recent Labs      04/10/17   0845   CPK  71   CKND1  2.0      Coagulation Recent Labs      04/10/17   0845   PTP  12.9   INR  1.0   APTT  29.0       Hepatitis Panel No results found for: HAMAT, HAAB, HABT, HAAT, XHBAGT, HBSAG, HBAGT, HBSB, HBSAT, HBABN, HBCM, HBCAB, HBCAT, XBCABS, HBEAB, HBEAG, HCAB, XHEPCS, [id number], HBEGLT, HBCMLT, HBCLT, HBEBLT, [id number], [id number], HAVMLT, [id number], HBCMLT, [id number], HCGAT   Amylase Lipase    Liver Enzymes Recent Labs      04/10/17   0845   TP  5.7*   ALB  2.8*   TBILI  0.3   AP  51   SGOT  26   ALT  21      Thyroid Studies No results for input(s): T4, T3U, TSH, TSHEXT in the last 72 hours. No lab exists for component: T3RU     Pathology pathology         No Known Allergies    Current Facility-Administered Medications   Medication Dose Route Frequency    pantoprazole (PROTONIX) 40 mg in 0.9% sodium chloride (MBP/ADV) 50 mL MBP  8 mg/hr IntraVENous CONTINUOUS    metoprolol tartrate (LOPRESSOR) tablet 25 mg  25 mg Oral BID    sodium chloride (NS) flush 5-10 mL  5-10 mL IntraVENous Q8H    sodium chloride (NS) flush 5-10 mL  5-10 mL IntraVENous PRN    0.9% sodium chloride infusion 250 mL  250 mL IntraVENous PRN    raloxifene (EVISTA) tablet 60 mg (Patient's Own Medication)  60 mg Oral DAILY       ASSESSMENT:  Principal Problem:    GI bleed (4/10/2017)    Active Problems:    Melena (4/10/2017)      Hypotension (4/10/2017)      Bleeding duodenal AVM coagulated yesterday. Suspect bleeding inactive since. Drop in Hg overnight likely catch up/equilibration. Will give one unit of blood, a dose of Venofer and start clear liquids, switch Pantoprazole to po. If she remains stable, can advance diet and DC in next 24 hours on Pantoprazole 40 mg daily, and Iron/vitamin C. FU with me in 3-4 weeks.     Chad Serra MD

## 2017-04-13 VITALS
WEIGHT: 141 LBS | SYSTOLIC BLOOD PRESSURE: 142 MMHG | HEIGHT: 59 IN | OXYGEN SATURATION: 93 % | RESPIRATION RATE: 18 BRPM | HEART RATE: 83 BPM | DIASTOLIC BLOOD PRESSURE: 70 MMHG | BODY MASS INDEX: 28.43 KG/M2 | TEMPERATURE: 98 F

## 2017-04-13 LAB
ABO + RH BLD: NORMAL
BLD PROD TYP BPU: NORMAL
BLOOD GROUP ANTIBODIES SERPL: NORMAL
BPU ID: NORMAL
CALLED TO:,BCALL1: NORMAL
CALLED TO:,BCALL2: NORMAL
CROSSMATCH RESULT,%XM: NORMAL
HGB BLD-MCNC: 9.3 G/DL (ref 12–16)
SPECIMEN EXP DATE BLD: NORMAL
STATUS OF UNIT,%ST: NORMAL
UNIT DIVISION, %UDIV: 0

## 2017-04-13 PROCEDURE — 85018 HEMOGLOBIN: CPT | Performed by: HOSPITALIST

## 2017-04-13 PROCEDURE — 74011250637 HC RX REV CODE- 250/637: Performed by: HOSPITALIST

## 2017-04-13 PROCEDURE — 74011250637 HC RX REV CODE- 250/637: Performed by: INTERNAL MEDICINE

## 2017-04-13 PROCEDURE — 36415 COLL VENOUS BLD VENIPUNCTURE: CPT | Performed by: HOSPITALIST

## 2017-04-13 RX ORDER — PANTOPRAZOLE SODIUM 40 MG/1
40 TABLET, DELAYED RELEASE ORAL
Qty: 60 TAB | Refills: 0 | Status: SHIPPED | OUTPATIENT
Start: 2017-04-13 | End: 2017-08-21

## 2017-04-13 RX ADMIN — METOPROLOL TARTRATE 25 MG: 25 TABLET ORAL at 11:03

## 2017-04-13 RX ADMIN — PANTOPRAZOLE SODIUM 40 MG: 40 TABLET, DELAYED RELEASE ORAL at 11:03

## 2017-04-13 RX ADMIN — AMLODIPINE BESYLATE 10 MG: 10 TABLET ORAL at 11:03

## 2017-04-13 RX ADMIN — Medication 10 ML: at 06:00

## 2017-04-13 NOTE — PROGRESS NOTES
conducted a Follow up consultation and Spiritual Assessment for Theodore Middleton, who is a 76 y.o.,female. The  provided the following Interventions:  Continued the relationship of care and support. Listened empathically. Offered prayer and assurance of continued prayer on patients behalf. Chart reviewed. The following outcomes were achieved:  Patient expressed gratitude for pastoral care visit. Assessment:  There are no further spiritual or Moravian issues which require Spiritual Care Services interventions at this time. Plan:  Chaplains will continue to follow and will provide pastoral care on an as needed/requested basis.  recommends bedside caregivers page  on duty if patient shows signs of acute spiritual or emotional distress.      [facility]   Staff [address] [place] [address]   [phone]

## 2017-04-13 NOTE — MED STUDENT NOTES
Subjective:  Patient seen and examined. No complaints today. Stools are back to normal brown color. No abdominal pain, nausea, or vomiting. Objective:  Visit Vitals    BP (!) 172/98 (BP 1 Location: Left arm, BP Patient Position: Supine)    Pulse 82    Temp 98.3 °F (36.8 °C)    Resp 18    Ht 4' 11\" (1.499 m)    Wt 141 lb (64 kg)    SpO2 95%    BMI 28.48 kg/m2   General: Alert and oriented. In no apparent distress. Sitting up in bed watching television  Heart: RRR with no extra sounds, murmurs, gallops, or rubs  Lungs: CTA bilaterally with no wheezes, crackles, or rhonchi  Abdomen: Bowel sounds normoactive. Soft, nontender to palpation with no masses or organomegaly. No rebound tenderness or guarding. Assessment:  -GI bleed: resolved. S/p EGD and 2 coagulated AVMs in descending duodenum  -Melena: resolved. -LYNN: S/p 1 unit PRBC yesterday. 3 units total since admission. Hgb 9.3  -Hypertension: Norvasc, Metoprolol    Plan:  -discharge home today  -f/u as per GI recommendation    *ATTENTION:  This note has been created by a medical student for educational purposes only. Please do not refer to the content of this note for clinical decision-making, billing, or other purposes. Please see attending physicians note to obtain clinical information on this patient. *

## 2017-04-13 NOTE — PROGRESS NOTES
0700: Bedside shift change report given to Kylah Vo RN (oncoming nurse) by Juhi Hernadez RN (offgoing nurse). Report included the following information SBAR, Kardex, Procedure Summary, Intake/Output and MAR.     1200: Pt. Discharge in, waiting for GI to see patient. 1420: Called GI, Pt. Can leave and come to follow up in one week. 1445: Pt. Discharge instructions reviewed thoroughly, pt. Taken down for discharge.

## 2017-04-13 NOTE — DISCHARGE SUMMARY
Discharge Summary    Patient: Mike Mckinney               Sex: female          DOA: 4/10/2017         YOB: 1940      Age:  76 y.o.        LOS:  LOS: 3 days                Admit Date: 4/10/2017    Discharge Date: 4/13/2017    Discharge Medications:     Current Discharge Medication List      START taking these medications    Details   pantoprazole (PROTONIX) 40 mg tablet Take 1 Tab by mouth Before breakfast and dinner. Qty: 60 Tab, Refills: 0         CONTINUE these medications which have NOT CHANGED    Details   diltiazem XR (DILACOR XR) 120 mg XR capsule Take  by mouth daily. metoprolol (LOPRESSOR) 25 mg tablet Take  by mouth two (2) times a day. raloxifene (EVISTA) 60 mg tablet Take  by mouth daily. lisinopril (PRINIVIL, ZESTRIL) 20 mg tablet Take  by mouth daily. LEVOTHYROXINE SODIUM (SYNTHROID PO) Take  by mouth. STOP taking these medications       esomeprazole (NEXIUM) 40 mg capsule Comments:   Reason for Stopping: Follow-up: PCP, GI    Discharge Condition: Stable    Activity: Activity as tolerated    Diet: Resume previous diet    Labs:  Labs: Results:       Chemistry Recent Labs      04/11/17   0445   GLU  111*   NA  143   K  4.0   CL  116*   CO2  19*   BUN  19*   CREA  0.79   CA  7.4*   AGAP  8   BUCR  24*      CBC w/Diff Recent Labs      04/13/17   0422  04/12/17   1650  04/12/17   1003   04/11/17   0920  04/11/17   0445  04/11/17   0025   WBC   --    --    --    --    --   5.2   --    RBC   --    --    --    --    --   2.74*   --    HGB  9.3*  10.2*  8.4*   < >  8.5*  8.1*  8.6*   HCT   --    --    --    --   24.9*  23.9*  25.9*   PLT   --    --    --    --    --   177   --    GRANS   --    --    --    --    --   57   --    LYMPH   --    --    --    --    --   30   --    EOS   --    --    --    --    --   0   --     < > = values in this interval not displayed. Cardiac Enzymes No results for input(s): CPK, CKND1, AZIZA in the last 72 hours.     No lab exists for component: CKRMB, TROIP   Coagulation No results for input(s): PTP, INR, APTT in the last 72 hours. No lab exists for component: INREXT    Lipid Panel No results found for: CHOL, CHOLPOCT, CHOLX, CHLST, CHOLV, [id number], HDL, LDL, NLDLCT, DLDL, LDLC, DLDLP, [id number], VLDLC, VLDL, TGL, TGLX, TRIGL, [id number], TRIGP, TGLPOCT, [id number], CHHD, CHHDX   BNP No results for input(s): BNPP in the last 72 hours. Liver Enzymes No results for input(s): TP, ALB, TBIL, AP, SGOT, GPT in the last 72 hours.     No lab exists for component: DBIL   Thyroid Studies No results found for: T4, T3U, TSH, TSHEXT       Imaging:    Pre-procedure Diagnoses:      Acute GI bleeding [K92.2]     Melena [K92.1]         Post-procedure Diagnoses:     AVM (arteriovenous malformation) of duodenum, acquired with hemorrhage [K31.811]     Hiatal hernia [K44.9]     Erosive gastritis [K29.60]         Procedures:     UPPER GI ENDOSCOPY,CTRL BLEED [[id number]]     UPPER GI ENDOSCOPY,BIOPSY [[id number]]       Consults: Gastroenterology    Treatment Team: Treatment Team: Attending Provider: Owen Oden MD; Consulting Provider: Carmella Martinez MD; Consulting Provider: Amira Davis MD; Utilization Review: Brandon Milian; Care Manager: Nolan Carrillo RN    Significant Diagnostic Studies: labs:   Recent Results (from the past 24 hour(s))   HEMOGLOBIN    Collection Time: 04/12/17  4:50 PM   Result Value Ref Range    HGB 10.2 (L) 12.0 - 16.0 g/dL   HEMOGLOBIN    Collection Time: 04/13/17  4:22 AM   Result Value Ref Range    HGB 9.3 (L) 12.0 - 16.0 g/dL       Discharge diagnoses:    Problem List as of 4/13/2017  Date Reviewed: 4/10/2017          Codes Class Noted - Resolved    Melena ICD-10-CM: K92.1  ICD-9-CM: 578.1  4/10/2017 - Present        Hypotension ICD-10-CM: I95.9  ICD-9-CM: 458.9  4/10/2017 - Present        * (Principal)GI bleed ICD-10-CM: B57.7  ICD-9-CM: 578.9  4/10/2017 - Present        Follow-up exam ICD-10-CM: I37  ICD-9-CM: V67.9 Unknown - Present    Overview Signed 11/12/2010  2:30 PM by Abigail Jimenez     Follow up from Laparoscopic cholecystectomy (3/8/2010). Hospital Course:   Major issues addressed during hospitalization outlined  below. Jarad Carr is a 76 y.o. female who presented to the ER with black stools. She began having diarrheal stools yesterday morning which were black. She had no nausea or vomiting. She has not had this in the past. This was associated with weakness and dizziness. She did not have chest pain or shortness of breath. She presented to the ER where she has heme positive stool and severely decreased hemoglobin. Initially her BP was decreased but this has improved with blood transfusion and with fluid. She will be admitted for ongoing management. Patient admitted to [facility]. Monitored Hb throughout stay. She was transfused a total of 3 U of blood during the hospitalization with stabilization of Hb by day of DC. Patient underwent EGD with findings of AVM in duodenum that was cauterized.   On 4/13  Patient was deemed safe for dc with pcp and GI f/u    Lizett Sanchez MD  April 13, 2017        Total time spent 40 minutes

## 2017-04-13 NOTE — PROGRESS NOTES
Bedside and Verbal shift change report given to [id number] Mary Faulkner (oncoming nurse) by Carson Rapp RN (offgoing nurse).  Report included the following information SBAR, Kardex, [facility] and Recent Results

## 2017-04-13 NOTE — DISCHARGE INSTRUCTIONS
DISCHARGE SUMMARY from Nurse    The following personal items are in your possession at time of discharge:    Dental Appliances: At bedside  Visual Aid: None     Home Medications: None  Jewelry: Watch  Clothing: At bedside, Shirt, Socks  Other Valuables: Cell Phone, Purse             PATIENT INSTRUCTIONS:    After general anesthesia or intravenous sedation, for 24 hours or while taking prescription Narcotics:  · Limit your activities  · Do not drive and operate hazardous machinery  · Do not make important personal or business decisions  · Do  not drink alcoholic beverages  · If you have not urinated within 8 hours after discharge, please contact your surgeon on call. Report the following to your surgeon:  · Excessive pain, swelling, redness or odor of or around the surgical area  · Temperature over 100.5  · Nausea and vomiting lasting longer than 4 hours or if unable to take medications  · Any signs of decreased circulation or nerve impairment to extremity: change in color, persistent  numbness, tingling, coldness or increase pain  · Any questions        What to do at Home:  Recommended activity: Activity as tolerated. If you experience any of the following symptoms Shortness of Breath, black or bloody stools, or Chest pain, please follow up with Primary care provider, or your closest emergency room. *  Please give a list of your current medications to your Primary Care Provider. *  Please update this list whenever your medications are discontinued, doses are      changed, or new medications (including over-the-counter products) are added. *  Please carry medication information at all times in case of emergency situations. These are general instructions for a healthy lifestyle:    No smoking/ No tobacco products/ Avoid exposure to second hand smoke    Surgeon General's Warning:  Quitting smoking now greatly reduces serious risk to your health.     Obesity, smoking, and sedentary lifestyle greatly increases your risk for illness    A healthy diet, regular physical exercise & weight monitoring are important for maintaining a healthy lifestyle    You may be retaining fluid if you have a history of heart failure or if you experience any of the following symptoms:  Weight gain of 3 pounds or more overnight or 5 pounds in a week, increased swelling in our hands or feet or shortness of breath while lying flat in bed. Please call your doctor as soon as you notice any of these symptoms; do not wait until your next office visit. Recognize signs and symptoms of STROKE:    F-face looks uneven    A-arms unable to move or move unevenly    S-speech slurred or non-existent    T-time-call 911 as soon as signs and symptoms begin-DO NOT go       Back to bed or wait to see if you get better-TIME IS BRAIN. Warning Signs of HEART ATTACK     Call 911 if you have these symptoms:   Chest discomfort. Most heart attacks involve discomfort in the center of the chest that lasts more than a few minutes, or that goes away and comes back. It can feel like uncomfortable pressure, squeezing, fullness, or pain.  Discomfort in other areas of the upper body. Symptoms can include pain or discomfort in one or both arms, the back, neck, jaw, or stomach.  Shortness of breath with or without chest discomfort.  Other signs may include breaking out in a cold sweat, nausea, or lightheadedness. Don't wait more than five minutes to call 911 - MINUTES MATTER! Fast action can save your life. Calling 911 is almost always the fastest way to get lifesaving treatment. Emergency Medical Services staff can begin treatment when they arrive -- up to an hour sooner than if someone gets to the hospital by car. The discharge information has been reviewed with the patient. The patient verbalized understanding.     Discharge medications reviewed with the patient and appropriate educational materials and side effects teaching were provided.

## 2017-06-09 ENCOUNTER — HOSPITAL ENCOUNTER (EMERGENCY)
Age: 77
Discharge: HOME | End: 2017-06-09
Attending: EMERGENCY MEDICINE
Payer: MEDICARE

## 2017-06-09 VITALS
BODY MASS INDEX: 25.65 KG/M2 | SYSTOLIC BLOOD PRESSURE: 164 MMHG | TEMPERATURE: 98.8 F | OXYGEN SATURATION: 98 % | DIASTOLIC BLOOD PRESSURE: 42 MMHG | RESPIRATION RATE: 19 BRPM | WEIGHT: 127 LBS | HEART RATE: 65 BPM

## 2017-06-09 DIAGNOSIS — R63.0 POOR APPETITE: Primary | ICD-10-CM

## 2017-06-09 DIAGNOSIS — R53.1 GENERALIZED WEAKNESS: ICD-10-CM

## 2017-06-09 LAB
ANION GAP BLD CALC-SCNC: 9 MMOL/L (ref 3–18)
APPEARANCE UR: CLEAR
BACTERIA URNS QL MICRO: NEGATIVE /HPF
BASOPHILS # BLD AUTO: 0.1 K/UL (ref 0–0.06)
BASOPHILS # BLD: 1 % (ref 0–2)
BILIRUB UR QL: NEGATIVE
BUN SERPL-MCNC: 16 MG/DL (ref 7–18)
BUN/CREAT SERPL: 15 (ref 12–20)
CALCIUM SERPL-MCNC: 9.6 MG/DL (ref 8.5–10.1)
CHLORIDE SERPL-SCNC: 105 MMOL/L (ref 100–108)
CK MB CFR SERPL CALC: NORMAL % (ref 0–4)
CK MB SERPL-MCNC: <1 NG/ML (ref 5–25)
CK SERPL-CCNC: 28 U/L (ref 26–192)
CO2 SERPL-SCNC: 23 MMOL/L (ref 21–32)
COLOR UR: YELLOW
CREAT SERPL-MCNC: 1.06 MG/DL (ref 0.6–1.3)
DIFFERENTIAL METHOD BLD: ABNORMAL
EOSINOPHIL # BLD: 0.1 K/UL (ref 0–0.4)
EOSINOPHIL NFR BLD: 1 % (ref 0–5)
EPITH CASTS URNS QL MICRO: NORMAL /LPF (ref 0–5)
ERYTHROCYTE [DISTWIDTH] IN BLOOD BY AUTOMATED COUNT: 17.2 % (ref 11.6–14.5)
GLUCOSE SERPL-MCNC: 107 MG/DL (ref 74–99)
GLUCOSE UR STRIP.AUTO-MCNC: NEGATIVE MG/DL
HCT VFR BLD AUTO: 24.4 % (ref 35–45)
HGB BLD-MCNC: 7.7 G/DL (ref 12–16)
HGB UR QL STRIP: NEGATIVE
KETONES UR QL STRIP.AUTO: NEGATIVE MG/DL
LEUKOCYTE ESTERASE UR QL STRIP.AUTO: ABNORMAL
LYMPHOCYTES # BLD AUTO: 27 % (ref 21–52)
LYMPHOCYTES # BLD: 1.8 K/UL (ref 0.9–3.6)
MCH RBC QN AUTO: 25.4 PG (ref 24–34)
MCHC RBC AUTO-ENTMCNC: 31.6 G/DL (ref 31–37)
MCV RBC AUTO: 80.5 FL (ref 74–97)
MONOCYTES # BLD: 1 K/UL (ref 0.05–1.2)
MONOCYTES NFR BLD AUTO: 15 % (ref 3–10)
NEUTS SEG # BLD: 3.8 K/UL (ref 1.8–8)
NEUTS SEG NFR BLD AUTO: 56 % (ref 40–73)
NITRITE UR QL STRIP.AUTO: NEGATIVE
PH UR STRIP: 5.5 [PH] (ref 5–8)
PLATELET # BLD AUTO: 475 K/UL (ref 135–420)
PMV BLD AUTO: 10.2 FL (ref 9.2–11.8)
POTASSIUM SERPL-SCNC: 4.1 MMOL/L (ref 3.5–5.5)
PROT UR STRIP-MCNC: NEGATIVE MG/DL
RBC # BLD AUTO: 3.03 M/UL (ref 4.2–5.3)
RBC #/AREA URNS HPF: NEGATIVE /HPF (ref 0–5)
SODIUM SERPL-SCNC: 137 MMOL/L (ref 136–145)
SP GR UR REFRACTOMETRY: 1.01 (ref 1–1.03)
TROPONIN I SERPL-MCNC: <0.02 NG/ML (ref 0–0.04)
UROBILINOGEN UR QL STRIP.AUTO: 0.2 EU/DL (ref 0.2–1)
WBC # BLD AUTO: 6.6 K/UL (ref 4.6–13.2)
WBC URNS QL MICRO: NORMAL /HPF (ref 0–4)

## 2017-06-09 PROCEDURE — 99285 EMERGENCY DEPT VISIT HI MDM: CPT

## 2017-06-09 PROCEDURE — 93005 ELECTROCARDIOGRAM TRACING: CPT

## 2017-06-09 PROCEDURE — 85025 COMPLETE CBC W/AUTO DIFF WBC: CPT | Performed by: EMERGENCY MEDICINE

## 2017-06-09 PROCEDURE — 86901 BLOOD TYPING SEROLOGIC RH(D): CPT | Performed by: EMERGENCY MEDICINE

## 2017-06-09 PROCEDURE — 80048 BASIC METABOLIC PNL TOTAL CA: CPT | Performed by: EMERGENCY MEDICINE

## 2017-06-09 PROCEDURE — 81001 URINALYSIS AUTO W/SCOPE: CPT | Performed by: EMERGENCY MEDICINE

## 2017-06-09 PROCEDURE — 82550 ASSAY OF CK (CPK): CPT | Performed by: EMERGENCY MEDICINE

## 2017-06-09 NOTE — ED TRIAGE NOTES
June 2 had GI bleed with 2 units blood transfused. Came today because feeling week and wants blood tested to see if counts low.  Denies pain nor bleeding

## 2017-06-09 NOTE — DISCHARGE INSTRUCTIONS
Weakness: Care Instructions  Your Care Instructions  Weakness is a lack of physical or muscle strength. You may feel that you need to make extra effort to move your arms, legs, or other muscles. Generalized weakness means that you feel weak in most areas of your body. Another type of weakness may affect just one muscle or group of muscles. You may feel weak and tired after you have done too much activity, such as taking an extra-long hike. This is not a serious problem. It often goes away on its own. Feeling weak can also be caused by medical conditions like thyroid problems, depression, or a virus. Sometimes the cause can be serious. Your doctor may want to do more tests to try to find the cause of the weakness. The doctor has checked you carefully, but problems can develop later. If you notice any problems or new symptoms, get medical treatment right away. Follow-up care is a key part of your treatment and safety. Be sure to make and go to all appointments, and call your doctor if you are having problems. It's also a good idea to know your test results and keep a list of the medicines you take. How can you care for yourself at home? · Rest when you feel tired. · Be safe with medicines. If your doctor prescribed medicine, take it exactly as prescribed. Call your doctor if you think you are having a problem with your medicine. You will get more details on the specific medicines your doctor prescribes. · Do not skip meals. Eating a balanced diet may increase your energy level. · Get some physical activity every day, but do not get too tired. When should you call for help? Call your doctor now or seek immediate medical care if:  · You have new or worse weakness. · You are dizzy or lightheaded, or you feel like you may faint. Watch closely for changes in your health, and be sure to contact your doctor if:  · You do not get better as expected. Where can you learn more?   Go to http://adriana.info/. Enter [id number] in the search box to learn more about \"Weakness: Care Instructions. \"  Current as of: May 27, 2016  Content Version: 11.2  © 8585-1496 [employer]. Care instructions adapted under license by [employer] (which disclaims liability or warranty for this information). If you have questions about a medical condition or this instruction, always ask your healthcare professional. [address] any warranty or liability for your use of this information.

## 2017-06-09 NOTE — ED NOTES
I performed a brief evaluation, including history and physical, of the patient here in triage and I have determined that pt will need further treatment and evaluation from the main side ER physician. I have placed initial orders to help in expediting patients care. June 09, 2017 at 3:06 PM - Lesley Pinto DO        There were no vitals taken for this visit.        Fatigue, prior hx of GIB and anemia

## 2017-06-10 LAB
ABO + RH BLD: NORMAL
ATRIAL RATE: 68 BPM
BLOOD GROUP ANTIBODIES SERPL: NORMAL
CALCULATED P AXIS, ECG09: 44 DEGREES
CALCULATED R AXIS, ECG10: 11 DEGREES
CALCULATED T AXIS, ECG11: 49 DEGREES
DIAGNOSIS, 93000: NORMAL
P-R INTERVAL, ECG05: 152 MS
Q-T INTERVAL, ECG07: 384 MS
QRS DURATION, ECG06: 74 MS
QTC CALCULATION (BEZET), ECG08: 408 MS
SPECIMEN EXP DATE BLD: NORMAL
VENTRICULAR RATE, ECG03: 68 BPM

## 2017-08-21 RX ORDER — LEVOTHYROXINE SODIUM 75 UG/1
75 TABLET ORAL
COMMUNITY

## 2017-08-21 RX ORDER — METOPROLOL SUCCINATE 50 MG/1
50 TABLET, EXTENDED RELEASE ORAL DAILY
COMMUNITY

## 2017-08-21 RX ORDER — ATORVASTATIN CALCIUM 80 MG/1
80 TABLET, FILM COATED ORAL DAILY
COMMUNITY

## 2017-08-23 ENCOUNTER — ANESTHESIA EVENT (OUTPATIENT)
Dept: ENDOSCOPY | Age: 77
End: 2017-08-23
Payer: MEDICARE

## 2017-08-24 ENCOUNTER — HOSPITAL ENCOUNTER (OUTPATIENT)
Age: 77
Setting detail: OUTPATIENT SURGERY
Discharge: HOME | End: 2017-08-24
Attending: INTERNAL MEDICINE | Admitting: INTERNAL MEDICINE
Payer: MEDICARE

## 2017-08-24 ENCOUNTER — ANESTHESIA (OUTPATIENT)
Dept: ENDOSCOPY | Age: 77
End: 2017-08-24
Payer: MEDICARE

## 2017-08-24 VITALS
HEART RATE: 71 BPM | SYSTOLIC BLOOD PRESSURE: 95 MMHG | BODY MASS INDEX: 23.24 KG/M2 | DIASTOLIC BLOOD PRESSURE: 75 MMHG | RESPIRATION RATE: 16 BRPM | TEMPERATURE: 97.4 F | HEIGHT: 59 IN | WEIGHT: 115.31 LBS | OXYGEN SATURATION: 99 %

## 2017-08-24 LAB
BASOPHILS # BLD: 0 K/UL (ref 0–0.06)
BASOPHILS NFR BLD: 1 % (ref 0–2)
DIFFERENTIAL METHOD BLD: ABNORMAL
EOSINOPHIL # BLD: 0.1 K/UL (ref 0–0.4)
EOSINOPHIL NFR BLD: 2 % (ref 0–5)
ERYTHROCYTE [DISTWIDTH] IN BLOOD BY AUTOMATED COUNT: 22.4 % (ref 11.6–14.5)
HCT VFR BLD AUTO: 31.2 % (ref 35–45)
HGB BLD-MCNC: 10.2 G/DL (ref 12–16)
LYMPHOCYTES # BLD: 1.1 K/UL (ref 0.9–3.6)
LYMPHOCYTES NFR BLD: 21 % (ref 21–52)
MCH RBC QN AUTO: 26.3 PG (ref 24–34)
MCHC RBC AUTO-ENTMCNC: 32.7 G/DL (ref 31–37)
MCV RBC AUTO: 80.4 FL (ref 74–97)
MONOCYTES # BLD: 0.5 K/UL (ref 0.05–1.2)
MONOCYTES NFR BLD: 10 % (ref 3–10)
NEUTS SEG # BLD: 3.4 K/UL (ref 1.8–8)
NEUTS SEG NFR BLD: 66 % (ref 40–73)
PLATELET # BLD AUTO: 306 K/UL (ref 135–420)
PMV BLD AUTO: 10.2 FL (ref 9.2–11.8)
RBC # BLD AUTO: 3.88 M/UL (ref 4.2–5.3)
WBC # BLD AUTO: 5.2 K/UL (ref 4.6–13.2)

## 2017-08-24 PROCEDURE — 76040000007: Performed by: INTERNAL MEDICINE

## 2017-08-24 PROCEDURE — 36415 COLL VENOUS BLD VENIPUNCTURE: CPT | Performed by: INTERNAL MEDICINE

## 2017-08-24 PROCEDURE — 88342 IMHCHEM/IMCYTCHM 1ST ANTB: CPT | Performed by: INTERNAL MEDICINE

## 2017-08-24 PROCEDURE — 77030009426 HC FCPS BIOP ENDOSC BSC -B: Performed by: INTERNAL MEDICINE

## 2017-08-24 PROCEDURE — 77030031670 HC DEV INFL ENDOTEK BIG60 MRTM -B: Performed by: INTERNAL MEDICINE

## 2017-08-24 PROCEDURE — 77030011640 HC PAD GRND REM COVD -A: Performed by: INTERNAL MEDICINE

## 2017-08-24 PROCEDURE — 76060000032 HC ANESTHESIA 0.5 TO 1 HR: Performed by: INTERNAL MEDICINE

## 2017-08-24 PROCEDURE — 74011250636 HC RX REV CODE- 250/636: Performed by: NURSE ANESTHETIST, CERTIFIED REGISTERED

## 2017-08-24 PROCEDURE — 77030022875 HC PRB AR PLSM COAG ERBE -C: Performed by: INTERNAL MEDICINE

## 2017-08-24 PROCEDURE — 74011250636 HC RX REV CODE- 250/636

## 2017-08-24 PROCEDURE — 85025 COMPLETE CBC W/AUTO DIFF WBC: CPT | Performed by: INTERNAL MEDICINE

## 2017-08-24 PROCEDURE — 74011000250 HC RX REV CODE- 250

## 2017-08-24 PROCEDURE — 88305 TISSUE EXAM BY PATHOLOGIST: CPT | Performed by: INTERNAL MEDICINE

## 2017-08-24 RX ORDER — PROPOFOL 10 MG/ML
INJECTION, EMULSION INTRAVENOUS
Status: DISCONTINUED | OUTPATIENT
Start: 2017-08-24 | End: 2017-08-24 | Stop reason: HOSPADM

## 2017-08-24 RX ORDER — SODIUM CHLORIDE 0.9 % (FLUSH) 0.9 %
5-10 SYRINGE (ML) INJECTION AS NEEDED
Status: DISCONTINUED | OUTPATIENT
Start: 2017-08-24 | End: 2017-08-24 | Stop reason: HOSPADM

## 2017-08-24 RX ORDER — SODIUM CHLORIDE, SODIUM LACTATE, POTASSIUM CHLORIDE, CALCIUM CHLORIDE 600; 310; 30; 20 MG/100ML; MG/100ML; MG/100ML; MG/100ML
50 INJECTION, SOLUTION INTRAVENOUS CONTINUOUS
Status: DISCONTINUED | OUTPATIENT
Start: 2017-08-25 | End: 2017-08-24 | Stop reason: HOSPADM

## 2017-08-24 RX ORDER — PROPOFOL 10 MG/ML
INJECTION, EMULSION INTRAVENOUS AS NEEDED
Status: DISCONTINUED | OUTPATIENT
Start: 2017-08-24 | End: 2017-08-24 | Stop reason: HOSPADM

## 2017-08-24 RX ORDER — FAMOTIDINE 20 MG/1
20 TABLET, FILM COATED ORAL ONCE
Status: DISCONTINUED | OUTPATIENT
Start: 2017-08-25 | End: 2017-08-24 | Stop reason: HOSPADM

## 2017-08-24 RX ORDER — LIDOCAINE HYDROCHLORIDE 20 MG/ML
INJECTION, SOLUTION EPIDURAL; INFILTRATION; INTRACAUDAL; PERINEURAL AS NEEDED
Status: DISCONTINUED | OUTPATIENT
Start: 2017-08-24 | End: 2017-08-24 | Stop reason: HOSPADM

## 2017-08-24 RX ORDER — SODIUM CHLORIDE 0.9 % (FLUSH) 0.9 %
5-10 SYRINGE (ML) INJECTION EVERY 8 HOURS
Status: DISCONTINUED | OUTPATIENT
Start: 2017-08-24 | End: 2017-08-24 | Stop reason: HOSPADM

## 2017-08-24 RX ORDER — OMEPRAZOLE 40 MG/1
40 CAPSULE, DELAYED RELEASE ORAL DAILY
Qty: 30 CAP | Refills: 2 | Status: SHIPPED | OUTPATIENT
Start: 2017-08-24 | End: 2017-09-23

## 2017-08-24 RX ADMIN — SODIUM CHLORIDE, SODIUM LACTATE, POTASSIUM CHLORIDE, AND CALCIUM CHLORIDE 50 ML/HR: 600; 310; 30; 20 INJECTION, SOLUTION INTRAVENOUS at 08:01

## 2017-08-24 RX ADMIN — PROPOFOL 50 MG: 10 INJECTION, EMULSION INTRAVENOUS at 09:13

## 2017-08-24 RX ADMIN — LIDOCAINE HYDROCHLORIDE 40 MG: 20 INJECTION, SOLUTION EPIDURAL; INFILTRATION; INTRACAUDAL; PERINEURAL at 09:13

## 2017-08-24 RX ADMIN — PROPOFOL 100 MCG/KG/MIN: 10 INJECTION, EMULSION INTRAVENOUS at 09:13

## 2017-08-24 NOTE — PERIOP NOTES
Discharge info given to patient and daughter, Ely Mc.  Both verbalized understanding, no signature pen pad not working

## 2017-08-24 NOTE — DISCHARGE INSTRUCTIONS
For the next 12 hours you should not:   1. drive   2. drink alcohol   3. operate any machinery   4. engage in activities that require mental sharpness or manual dexterity    5. take any drugs other than those prescribed by a physician   6. make any legal or financial decisions    Call your doctor's office immediately, if:   1. Severe rectal bleeding   2. High fever   3. Severe abdominal pain    Take it easy today and resume normal activity tomorrow. Resume previous diet. AVOID TAKING ASPIRIN OR OTHER OVER THE COUNTER ANTI-INFLAMMATORY MEDICATIONS. BEGIN OMEPRAZOLE 40 MG CAPSULE ONCE A DAY. Felix P. Candance Sauer, MD, [facility]                 Colonoscopy: What to Expect at [address] [place] [address]  After you have a colonoscopy, you will stay at the clinic for 1 to 2 hours until the medicines wear off. Then you can go home. But you will need to arrange for a ride. Your doctor will tell you when you can eat and do your other usual activities. Your doctor will talk to you about when you will need your next colonoscopy. Your doctor can help you decide how often you need to be checked. This will depend on the results of your test and your risk for colorectal cancer. After the test, you may be bloated or have gas pains. You may need to pass gas. If a biopsy was done or a polyp was removed, you may have streaks of blood in your stool (feces) for a few days. This care sheet gives you a general idea about how long it will take for you to recover. But each person recovers at a different pace. Follow the steps below to get better as quickly as possible. How can you care for yourself at home? Activity  · Rest when you feel tired. · You can do your normal activities when it feels okay to do so. Diet  · Follow your doctor's directions for eating. · Unless your doctor has told you not to, drink plenty of fluids. This helps to replace the fluids that were lost during the colon prep.   · Do not drink alcohol. Medicines  · Your doctor will tell you if and when you can restart your medicines. He or she will also give you instructions about taking any new medicines. · If you take blood thinners, such as warfarin (Coumadin), clopidogrel (Plavix), or aspirin, be sure to talk to your doctor. He or she will tell you if and when to start taking those medicines again. Make sure that you understand exactly what your doctor wants you to do. · If polyps were removed or a biopsy was done during the test, your doctor may tell you not to take aspirin or other anti-inflammatory medicines for a few days. These include ibuprofen (Advil, Motrin) and naproxen (Aleve). Other instructions  · For your safety, do not drive or operate machinery until the medicine wears off and you can think clearly. Your doctor may tell you not to drive or operate machinery until the day after your test.  · Do not sign legal documents or make major decisions until the medicine wears off and you can think clearly. The anesthesia can make it hard for you to fully understand what you are agreeing to. Follow-up care is a key part of your treatment and safety. Be sure to make and go to all appointments, and call your doctor if you are having problems. It's also a good idea to know your test results and keep a list of the medicines you take. When should you call for help? Call 911 anytime you think you may need emergency care. For example, call if:  · You passed out (lost consciousness). · You pass maroon or bloody stools. · You have severe belly pain. Call your doctor now or seek immediate medical care if:  · Your stools are black and tarlike. · Your stools have streaks of blood, but you did not have a biopsy or any polyps removed. · You have belly pain, or your belly is swollen and firm. · You vomit. · You have a fever. · You are very dizzy.   Watch closely for changes in your health, and be sure to contact your doctor if you have any problems. Where can you learn more? Go to http://nori-solo.info/. Enter E264 in the search box to learn more about \"Colonoscopy: What to Expect at Home. \"  Current as of: August 9, 2016  Content Version: 11.3  © 8295-3955 [employer]. Care instructions adapted under license by [employer] (which disclaims liability or warranty for this information). If you have questions about a medical condition or this instruction, always ask your healthcare professional. [place] [address] any warranty or liability for your use of this information. Upper GI Endoscopy: What to Expect at [address] [place] [address]  After you have an endoscopy, you will stay at the hospital or clinic for 1 to 2 hours. This will allow the medicine to wear off. You will be able to go home after your doctor or nurse checks to make sure you are not having any problems. You may have to stay overnight if you had treatment during the test. You may have a sore throat for a day or two after the test.  This care sheet gives you a general idea about what to expect after the test.  How can you care for yourself at home? Activity  · Rest as much as you need to after you go home. · You should be able to go back to your usual activities the day after the test.  Diet  · Follow your doctor's directions for eating after the test.  · Drink plenty of fluids (unless your doctor has told you not to). Medications  · If you have a sore throat the day after the test, use an over-the-counter spray to numb your throat. Follow-up care is a key part of your treatment and safety. Be sure to make and go to all appointments, and call your doctor if you are having problems. It's also a good idea to know your test results and keep a list of the medicines you take. When should you call for help? Call 911 anytime you think you may need emergency care.  For example, call if:  · You passed out (lost consciousness). · You cough up blood. · You vomit blood or what looks like coffee grounds. · You pass maroon or very bloody stools. Call your doctor now or seek immediate medical care if:  · You have trouble swallowing. · You have belly pain. · Your stools are black and tarlike or have streaks of blood. · You are sick to your stomach or cannot keep fluids down. Watch closely for changes in your health, and be sure to contact your doctor if:  · Your throat still hurts after a day or two. · You do not get better as expected. Where can you learn more? Go to http://nori-solo.info/. Enter (97) 254-816 in the search box to learn more about \"Upper GI Endoscopy: What to Expect at Home. \"  Current as of: August 9, 2016  Content Version: 11.3  © 0621-7322 [employer]. Care instructions adapted under license by [employer] (which disclaims liability or warranty for this information). If you have questions about a medical condition or this instruction, always ask your healthcare professional. [address] any warranty or liability for your use of this information. DISCHARGE SUMMARY from Nurse    The following personal items are in your possession at time of discharge:    Dental Appliances: Lowers, Uppers  Visual Aid: Glasses  Hearing Aids/Status: With patient                         PATIENT INSTRUCTIONS:    After general anesthesia or intravenous sedation, for 24 hours or while taking prescription Narcotics:  · Limit your activities  · Do not drive and operate hazardous machinery  · Do not make important personal or business decisions  · Do  not drink alcoholic beverages  · If you have not urinated within 8 hours after discharge, please contact your surgeon on call.     Report the following to your surgeon:  · Excessive pain, swelling, redness or odor of or around the surgical area  · Temperature over 100.5  · Nausea and vomiting lasting longer than 4 hours or if unable to take medications  · Any signs of decreased circulation or nerve impairment to extremity: change in color, persistent  numbness, tingling, coldness or increase pain  · Any questions        What to do at Home:  These are general instructions for a healthy lifestyle:    No smoking/ No tobacco products/ Avoid exposure to second hand smoke    Surgeon General's Warning:  Quitting smoking now greatly reduces serious risk to your health. Obesity, smoking, and sedentary lifestyle greatly increases your risk for illness    A healthy diet, regular physical exercise & weight monitoring are important for maintaining a healthy lifestyle    You may be retaining fluid if you have a history of heart failure or if you experience any of the following symptoms:  Weight gain of 3 pounds or more overnight or 5 pounds in a week, increased swelling in our hands or feet or shortness of breath while lying flat in bed. Please call your doctor as soon as you notice any of these symptoms; do not wait until your next office visit. Recognize signs and symptoms of STROKE:    F-face looks uneven    A-arms unable to move or move unevenly    S-speech slurred or non-existent    T-time-call 911 as soon as signs and symptoms begin-DO NOT go       Back to bed or wait to see if you get better-TIME IS BRAIN. Warning Signs of HEART ATTACK     Call 911 if you have these symptoms:   Chest discomfort. Most heart attacks involve discomfort in the center of the chest that lasts more than a few minutes, or that goes away and comes back. It can feel like uncomfortable pressure, squeezing, fullness, or pain.  Discomfort in other areas of the upper body. Symptoms can include pain or discomfort in one or both arms, the back, neck, jaw, or stomach.  Shortness of breath with or without chest discomfort.  Other signs may include breaking out in a cold sweat, nausea, or lightheadedness.   Don't wait more than five minutes to call 911 - MINUTES MATTER! Fast action can save your life. Calling 911 is almost always the fastest way to get lifesaving treatment. Emergency Medical Services staff can begin treatment when they arrive -- up to an hour sooner than if someone gets to the hospital by car. The discharge information has been reviewed with the patient. The patient verbalized understanding. Discharge medications reviewed with the patient and appropriate educational materials and side effects teaching were provided. Patient armband removed and given to patient to take home.   Patient was informed of the privacy risks if armband lost or stolen

## 2017-08-24 NOTE — PROCEDURES
Esophagogastroduodenoscopy (EGD) Report    Patient: Chrissy Lovell MRN: [id number]  SSN: [id number]    YOB: 1940  Age: 76 y.o. Sex: female      Date/Time:  8/24/2017 9:45 AM    Procedure: Esophagogastroduodenoscopy with biopsy and  argon plasma coagulation of duodenal AVMs. FINDINGS:  1. Esophagus -  6 cm long hiatal hernia. Z-line was located at 30 cm. No ulcer, mass or varices. 2.  Stomach - Two (2), 8-10 mm, white-based, ulcers along the posterior antral wall with surrounding edematous and friable mucosa. Margins of the ulcers were biopsied. No mass or varices. No AVMs. 3.  Duodenum - Two (2), 3-4 mm, AVMs versus erosions in the proximal 2nd portion that bled steadily with manipulation. Hemostasis was achieved using APC at 1.6 L/min flow rate and 40 watt settings. IMPRESSION:   1. Gastric antral ulcers probably ASA induced. 2.  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 RECOMMENDATIONS:  1. Reduce ASA to every other day dosing. I would prefer this be permanently discontinued if cleared by Dr. Feliciano Cain and her vascular surgeon. 2.   Begin Omeprazole 40 mg PO QAM indefinitely as long as she is taking ASA. 3.   Repeat EGD in 8 weeks to document ulcer healing and ablate any additional AVMs. Indication: Gastrointestinal hemorrhage, unspecified  :  Caroline Nickerson MD  Referring Provider:   Raenette Najjar, MD  History: The history and physical exam were reviewed and updated. Endoscope: [id number]  Extent of Exam: second portion of the duodenum  ASA: ASA 3 - Patient with moderate systemic disease with functional limitations  Anethesia/Sedation:  MAC anesthesia Propofol    Description of the procedure: The procedure was discussed with the patient including risks, benefits, alternatives including risks of iv sedation, bleeding, perforation and aspiration. A safety timeout was performed.  The patient was placed in the left lateral decubitus position. A bite block was placed. Sedation/anesthesia was administered. The patients vital signs were monitored at all times including heart rate/rhythm, blood pressure and oxygen saturation. The endoscope was then passed under direct visualization to the second portion of the duodenum. The endoscope was then slowly withdrawn while visualizing the mucosa. In the stomach a retroflexion was performed and gastric fundus and cardia visualized. The endoscope was then slowly withdrawn. The patient was then transferred to recovery in stable condition. Therapies: APC of duodenal AVMs    Specimens:   ID Type Source Tests Collected by Time Destination   1 : bx antral ulcer r/o H. Pylori  Preservative Stomach, Antrum  Valerie Anne MD 8/24/2017 0536 Pathology              Complications:   None; patient tolerated the procedure well. EBL:Minimal      Roberto Joel MD, [place]  [facility]  August 24, 2017  9:45 AM

## 2017-08-24 NOTE — ANESTHESIA PREPROCEDURE EVALUATION
Anesthetic History   No history of anesthetic complications            Review of Systems / Medical History  Patient summary reviewed and pertinent labs reviewed    Pulmonary  Within defined limits                 Neuro/Psych   Within defined limits           Cardiovascular    Hypertension              Exercise tolerance: <4 METS     GI/Hepatic/Renal  Within defined limits              Endo/Other  Within defined limits    Hypothyroidism       Other Findings   Comments:   Risk Factors for Postoperative nausea/vomiting:       History of postoperative nausea/vomiting? NO       Female? YES       Motion sickness? NO       Intended opioid administration for postoperative analgesia? NO      Smoking Abstinence  Current Smoker? NO  Elective Surgery? YES  Seen preoperatively by anesthesiologist or proxy prior to day of surgery? YES  Pt abstained from smoking 24 hours prior to anesthesia?  YES               Physical Exam    Airway  Mallampati: II  TM Distance: 4 - 6 cm  Neck ROM: normal range of motion   Mouth opening: Normal     Cardiovascular    Rhythm: regular  Rate: normal         Dental    Dentition: Edentulous     Pulmonary  Breath sounds clear to auscultation               Abdominal  GI exam deferred       Other Findings            Anesthetic Plan    ASA: 2  Anesthesia type: MAC          Induction: Intravenous  Anesthetic plan and risks discussed with: Patient

## 2017-08-24 NOTE — PROCEDURES
Colonoscopy Report     Date of Procedure:2017       Patient: Oswaldo Bello  Date of Birth: @     MRN: [id number]   Age: 76 y.o.   SSN: [id number]  Sex: female            FINDINGS & IMPRESSION:  1. Mild sigmoid diverticulosis and redundant anatomy. 2.    No colonic mass, polyps or AVMs. 3.    2.5 cm smooth, soft, submucosal polypoid lesion in the distal ileum demonstrating the \"pillow sign\" when manipulated by a closed biopsy forcep. Not biopsied. No small bowel AVMs. RECOMMENDATIONS  1. Resume all home medications and diet. 2. Reduce ASA to every other day if cleared by Dr. Koffi Garcia and her vascular surgeon. 3. No further need for colorectal cancer screening in 10 years based on current MSTF and ACG guidelines. 4. Check CBC today and continue oral FeS04 BID daily. 5. RTO in 8 weeks. Indication:  Iron deficiency anemia  Procedure Performed: Colonoscopy   Endoscopist: Kay Mai MD  Assistant: Endoscopy Technician-1: Maryjane Laboy  Endoscopy RN-1: Natalia Rhoades RN  ASA: ASA 3 - Patient with moderate systemic disease with functional limitations  Mallampati Score: II (soft palate, uvula, fauces visible)  Anesthesia: MAC anesthesia Propofol  Endoscope: [id number]  Extent of Examination:cecum, which was identified by the ileocecal valve and appendiceal orifice, terminal ileum  Quality of Preparation:     [x]  Excellent   []  Very Good   [] Fair but adequate   [] Fair, inadequate   []  Poor      Technique: Informed consent was obtained. A safety timeout was performed. The patient was placed in left lateral position. A perianal inspection and a digital rectal exam were performed. The patients vital signs were monitored at all times including heart rate, rhythm, blood pressure and oxygen saturation. Sedation/anesthesia was administered.   When adequate sedation was achieved the video colonoscope was introduced into the rectum and advanced under direct vision up to the cecum, which was identified by the ileocecal valve and appendiceal orifice. The cecum was identified by ileocecal valve, appendiceal orifice and confluence of the colonic folds. The terminal ileum was intubated. With adequate insufflation and maneuvering of the withdrawing scope, the colonic mucosa was visualized carefully. Retroflexion was performed in the rectum and the distal rectum visualized. The patient tolerated the procedure very well and was transferred to recovery area. EBL:   None    Specimen:   ID Type Source Tests Collected by Time Destination   1 : bx antral ulcer r/o H. Pylori  Preservative Stomach, Antrum  Yessi Royal MD 8/24/2017 9823 Pathology         [facility].  Mauro Celis MD, [address]  [facility]  August 24, 2017  9:52 AM

## 2017-08-24 NOTE — ANESTHESIA POSTPROCEDURE EVALUATION
Post-Anesthesia Evaluation and Assessment    Patient: Mehdi Marcial MRN: [id number]  SSN: [id number]    YOB: 1940  Age: 76 y.o. Sex: female       Cardiovascular Function/Vital Signs  Visit Vitals    /61    Pulse (!) 59    Temp 36.3 °C (97.4 °F)    Resp 15    Ht 4' 11\" (1.499 m)    Wt 52.3 kg (115 lb 5 oz)    SpO2 99%    BMI 23.29 kg/m2       Patient is status post MAC anesthesia for Procedure(s):  ESOPHAGOGASTRODUODENOSCOPY and apc  COLONOSCOPY. Nausea/Vomiting: None    Postoperative hydration reviewed and adequate. Pain:  Pain Scale 1: Numeric (0 - 10) (08/24/17 0751)  Pain Intensity 1: 0 (08/24/17 0755)   Managed    Neurological Status: At baseline    Mental Status and Level of Consciousness: Alert and oriented     Pulmonary Status:   O2 Device: Room air (08/24/17 0948)   Adequate oxygenation and airway patent    Complications related to anesthesia: None    Post-anesthesia assessment completed.  No concerns    Signed By: Ena Armando CRNA     August 24, 2017

## 2017-08-24 NOTE — IP AVS SNAPSHOT
[address] [place] [address] [address] 
[phone] Patient: Emily Jalloh MRN: [id number] VDB:[id number] You are allergic to the following No active allergies Recent Documentation Height Weight BMI OB Status Smoking Status 1.499 m 52.3 kg 23.29 kg/m2 Postmenopausal Former Smoker Emergency Contacts Name Discharge Info Relation Home Work Mobile Yolanda Solano DISCHARGE CAREGIVER [3] Daughter [21] [phone] SonHerber   [phone] About your hospitalization You were admitted on:  August 24, 2017 You last received care in the:  [facility] PHASE 2 RECOVERY You were discharged on:  August 24, 2017 Unit phone number:  [phone] Why you were hospitalized Your primary diagnosis was:  Not on File Providers Seen During Your Hospitalizations Provider Role Specialty Primary office phone Radha Quinones MD Attending Provider Gastroenterology [phone] Your Primary Care Physician (PCP) Primary Care Physician Office Phone Office Fax [address]4 [address]  [phone] Follow-up Information Follow up With Details Comments Contact Info Tila Carrion MD Schedule an appointment as soon as possible for a visit  [facility] [address] [place] [address] [phone] Radha Quinones MD In 8 weeks  [address] [facility] [address] [place] [address] [phone] Current Discharge Medication List  
  
START taking these medications Dose & Instructions Dispensing Information Comments Morning Noon Evening Bedtime  
 omeprazole 40 mg capsule Commonly known as:  PRILOSEC Your last dose was: Your next dose is:    
   
   
 Dose:  40 mg Take 1 Cap by mouth daily for 30 days. Quantity:  30 Cap Refills:  2 CONTINUE these medications which have NOT CHANGED Dose & Instructions Dispensing Information Comments Morning Noon Evening Bedtime  
 atorvastatin 80 mg tablet Commonly known as:  LIPITOR Your last dose was: Your next dose is:    
   
   
 Dose:  80 mg Take 80 mg by mouth daily. Refills:  0  
     
   
   
   
  
 dilTIAZem  mg XR capsule Commonly known as:  DILACOR XR Your last dose was: Your next dose is: Take  by mouth daily. Refills:  0 EVISTA 60 mg tablet Generic drug:  raloxifene Your last dose was: Your next dose is: Take  by mouth daily. Refills:  0  
     
   
   
   
  
 levothyroxine 75 mcg tablet Commonly known as:  SYNTHROID Your last dose was: Your next dose is:    
   
   
 Dose:  75 mcg Take 75 mcg by mouth Daily (before breakfast). Refills:  0  
     
   
   
   
  
 lisinopril 20 mg tablet Commonly known as:  Karn Desanctis Your last dose was: Your next dose is: Take  by mouth daily. Refills:  0  
     
   
   
   
  
 metoprolol succinate 50 mg XL tablet Commonly known as:  TOPROL-XL Your last dose was: Your next dose is:    
   
   
 Dose:  50 mg Take 50 mg by mouth daily. Refills:  0 Where to Get Your Medications Information on where to get these meds will be given to you by the nurse or doctor. ! Ask your nurse or doctor about these medications  
  omeprazole 40 mg capsule Discharge Instructions For the next 12 hours you should not: 1. drive 2. drink alcohol 3. operate any machinery 4. engage in activities that require mental sharpness or manual dexterity 5. take any drugs other than those prescribed by a physician 6. make any legal or financial decisions Call your doctor's office immediately, if: 
 1. Severe rectal bleeding 2. High fever 3. Severe abdominal pain Take it easy today and resume normal activity tomorrow. Resume previous diet. AVOID TAKING ASPIRIN OR OTHER OVER THE COUNTER ANTI-INFLAMMATORY MEDICATIONS. BEGIN OMEPRAZOLE 40 MG CAPSULE ONCE A DAY. Roberto Joel MD, [facility] Colonoscopy: What to Expect at [facility] Your Recovery After you have a colonoscopy, you will stay at the clinic for 1 to 2 hours until the medicines wear off. Then you can go home. But you will need to arrange for a ride. Your doctor will tell you when you can eat and do your other usual activities. Your doctor will talk to you about when you will need your next colonoscopy. Your doctor can help you decide how often you need to be checked. This will depend on the results of your test and your risk for colorectal cancer. After the test, you may be bloated or have gas pains. You may need to pass gas. If a biopsy was done or a polyp was removed, you may have streaks of blood in your stool (feces) for a few days. This care sheet gives you a general idea about how long it will take for you to recover. But each person recovers at a different pace. Follow the steps below to get better as quickly as possible. How can you care for yourself at home? Activity · Rest when you feel tired. · You can do your normal activities when it feels okay to do so. Diet · Follow your doctor's directions for eating. · Unless your doctor has told you not to, drink plenty of fluids. This helps to replace the fluids that were lost during the colon prep. · Do not drink alcohol. Medicines · Your doctor will tell you if and when you can restart your medicines. He or she will also give you instructions about taking any new medicines. · If you take blood thinners, such as warfarin (Coumadin), clopidogrel (Plavix), or aspirin, be sure to talk to your doctor.  He or she will tell you if and when to start taking those medicines again. Make sure that you understand exactly what your doctor wants you to do. · If polyps were removed or a biopsy was done during the test, your doctor may tell you not to take aspirin or other anti-inflammatory medicines for a few days. These include ibuprofen (Advil, Motrin) and naproxen (Aleve). Other instructions · For your safety, do not drive or operate machinery until the medicine wears off and you can think clearly. Your doctor may tell you not to drive or operate machinery until the day after your test. 
· Do not sign legal documents or make major decisions until the medicine wears off and you can think clearly. The anesthesia can make it hard for you to fully understand what you are agreeing to. Follow-up care is a key part of your treatment and safety. Be sure to make and go to all appointments, and call your doctor if you are having problems. It's also a good idea to know your test results and keep a list of the medicines you take. When should you call for help? Call 911 anytime you think you may need emergency care. For example, call if: 
· You passed out (lost consciousness). · You pass maroon or bloody stools. · You have severe belly pain. Call your doctor now or seek immediate medical care if: 
· Your stools are black and tarlike. · Your stools have streaks of blood, but you did not have a biopsy or any polyps removed. · You have belly pain, or your belly is swollen and firm. · You vomit. · You have a fever. · You are very dizzy. Watch closely for changes in your health, and be sure to contact your doctor if you have any problems. Where can you learn more? Go to http://nori-solo.info/. Enter E264 in the search box to learn more about \"Colonoscopy: What to Expect at Home. \" Current as of: August 9, 2016 Content Version: 11.3 © 0106-9731 [employer].  Care instructions adapted under license by [address] (which disclaims liability or warranty for this information). If you have questions about a medical condition or this instruction, always ask your healthcare professional. [address] any warranty or liability for your use of this information. Upper GI Endoscopy: What to Expect at [facility] Your Recovery After you have an endoscopy, you will stay at the hospital or clinic for 1 to 2 hours. This will allow the medicine to wear off. You will be able to go home after your doctor or nurse checks to make sure you are not having any problems. You may have to stay overnight if you had treatment during the test. You may have a sore throat for a day or two after the test. 
This care sheet gives you a general idea about what to expect after the test. 
How can you care for yourself at home? Activity · Rest as much as you need to after you go home. · You should be able to go back to your usual activities the day after the test. 
Diet · Follow your doctor's directions for eating after the test. 
· Drink plenty of fluids (unless your doctor has told you not to). Medications · If you have a sore throat the day after the test, use an over-the-counter spray to numb your throat. Follow-up care is a key part of your treatment and safety. Be sure to make and go to all appointments, and call your doctor if you are having problems. It's also a good idea to know your test results and keep a list of the medicines you take. When should you call for help? Call 911 anytime you think you may need emergency care. For example, call if: 
· You passed out (lost consciousness). · You cough up blood. · You vomit blood or what looks like coffee grounds. · You pass maroon or very bloody stools. Call your doctor now or seek immediate medical care if: 
· You have trouble swallowing. · You have belly pain. · Your stools are black and tarlike or have streaks of blood. · You are sick to your stomach or cannot keep fluids down. Watch closely for changes in your health, and be sure to contact your doctor if: 
· Your throat still hurts after a day or two. · You do not get better as expected. Where can you learn more? Go to http://nori-solo.info/. Enter (82) 486-484 in the search box to learn more about \"Upper GI Endoscopy: What to Expect at Home. \" Current as of: August 9, 2016 Content Version: 11.3 © 7544-4139 [employer]. Care instructions adapted under license by [employer] (which disclaims liability or warranty for this information). If you have questions about a medical condition or this instruction, always ask your healthcare professional. [address] any warranty or liability for your use of this information. DISCHARGE SUMMARY from Nurse The following personal items are in your possession at time of discharge: 
 
Dental Appliances: Lowers, Uppers Visual Aid: Glasses Hearing Aids/Status: With patient PATIENT INSTRUCTIONS: 
 
After general anesthesia or intravenous sedation, for 24 hours or while taking prescription Narcotics: · Limit your activities · Do not drive and operate hazardous machinery · Do not make important personal or business decisions · Do  not drink alcoholic beverages · If you have not urinated within 8 hours after discharge, please contact your surgeon on call. Report the following to your surgeon: 
· Excessive pain, swelling, redness or odor of or around the surgical area · Temperature over 100.5 · Nausea and vomiting lasting longer than 4 hours or if unable to take medications · Any signs of decreased circulation or nerve impairment to extremity: change in color, persistent  numbness, tingling, coldness or increase pain · Any questions What to do at Home: These are general instructions for a healthy lifestyle: No smoking/ No tobacco products/ Avoid exposure to second hand smoke Surgeon General's Warning:  Quitting smoking now greatly reduces serious risk to your health. Obesity, smoking, and sedentary lifestyle greatly increases your risk for illness A healthy diet, regular physical exercise & weight monitoring are important for maintaining a healthy lifestyle You may be retaining fluid if you have a history of heart failure or if you experience any of the following symptoms:  Weight gain of 3 pounds or more overnight or 5 pounds in a week, increased swelling in our hands or feet or shortness of breath while lying flat in bed. Please call your doctor as soon as you notice any of these symptoms; do not wait until your next office visit. Recognize signs and symptoms of STROKE: 
 
F-face looks uneven A-arms unable to move or move unevenly S-speech slurred or non-existent T-time-call 911 as soon as signs and symptoms begin-DO NOT go Back to bed or wait to see if you get better-TIME IS BRAIN. Warning Signs of HEART ATTACK Call 911 if you have these symptoms: 
? Chest discomfort. Most heart attacks involve discomfort in the center of the chest that lasts more than a few minutes, or that goes away and comes back. It can feel like uncomfortable pressure, squeezing, fullness, or pain. ? Discomfort in other areas of the upper body. Symptoms can include pain or discomfort in one or both arms, the back, neck, jaw, or stomach. ? Shortness of breath with or without chest discomfort. ? Other signs may include breaking out in a cold sweat, nausea, or lightheadedness. Don't wait more than five minutes to call [address]! Fast action can save your life. Calling 911 is almost always the fastest way to get lifesaving treatment. Emergency Medical Services staff can begin treatment when they arrive  up to an hour sooner than if someone gets to the hospital by car. The discharge information has been reviewed with the patient. The patient verbalized understanding. Discharge medications reviewed with the patient and appropriate educational materials and side effects teaching were provided. Patient armband removed and given to patient to take home. Patient was informed of the privacy risks if armband lost or stolen Discharge Orders None Introducing [facility] [place] [facility]! [facility] introduces [employer] patient portal. Now you can access parts of your medical record, email your doctor's office, and request medication refills online. 1. In your internet browser, go to [email or web address] 2. Click on the First Time User? Click Here link in the Sign In box. You will see the New Member Sign Up page. 3. Enter your [employer] Access Code exactly as it appears below. You will not need to use this code after youve completed the sign-up process. If you do not sign up before the expiration date, you must request a new code. · [employer] Access Code: [email or web address] Expires: 11/22/2017  7:10 AM 
 
4. Enter the last four digits of your Social Security Number (xxxx) and Date of Birth (mm/dd/yyyy) as indicated and click Submit. You will be taken to the next sign-up page. 5. Create a [facility] ID. This will be your [employer] login ID and cannot be changed, so think of one that is secure and easy to remember. 6. Create a [employer] password. You can change your password at any time. 7. Enter your Password Reset Question and Answer. This can be used at a later time if you forget your password. 8. Enter your e-mail address. You will receive e-mail notification when new information is available in [address] 9. Click Sign Up. You can now view and download portions of your medical record. 10. Click the Download Summary menu link to download a portable copy of your medical information. If you have questions, please visit the Frequently Asked Questions section of the MyChart website. Remember, MyChart is NOT to be used for urgent needs. For medical emergencies, dial 911. Now available from your iPhone and Android! General Information Please provide this summary of care documentation to your next provider. Patient Signature:  ____________________________________________________________ Date:  ____________________________________________________________  
  
[facility] Provider Signature:  ____________________________________________________________ Date:  ____________________________________________________________

## 2017-08-27 NOTE — H&P
History and Physical    Kwame Mealing  1940  [id number]  [id number]    Assessment:  Iron deficiency anemia. Treatment/Plan:  Colonoscopy    History of present illness:  76 y.o. female here for diagnostic colonoscopy to eval Fe def anemia. ROS: No chest pain, shortness of breath, headaches, dizziness, lightheadedness, nausea, vomiting or abdominal pain. Evaluation of past illnesses, surgeries, or injuries:  yes    Past Medical History:   Diagnosis Date    Hypertension     Thyroid disease        Past Surgical History:   Procedure Laterality Date    COLONOSCOPY  8/24/2017         COLONOSCOPY N/A 8/24/2017    COLONOSCOPY performed by Vito Bolivar MD at [address] [place] [address] EGD  8/24/2017         HX CHOLECYSTECTOMY  3/8/2010    VASCULAR SURGERY PROCEDURE UNLIST      Stents in femoral both groins       Allergies:  No Known Allergies    Previous reactions to sedation/analgesia? no     Review of current medications, supplement, herbals and nutraceuticals complete:  yes    Pertinent labs reviewed? yes    History of active substance abuse?  no    History reviewed. No pertinent family history. Social History     Social History    Marital status:      Spouse name: N/A    Number of children: N/A    Years of education: N/A     Occupational History    Not on file. Social History Main Topics    Smoking status: Former Smoker     Quit date: 8/21/1980    Smokeless tobacco: Never Used    Alcohol use No    Drug use: No    Sexual activity: Not on file     Other Topics Concern    Not on file     Social History Narrative       Examination:  Cardiac Status:  WNL    Mental Status:  WNL     Pulmonary Status:  WNL    NPO:  9-12    Roberto Cohen MD, [place]  8/27/2017  2:20 PM

## 2022-09-26 NOTE — ED PROVIDER NOTES
HPI Comments: 4:15 PM Yuni Porter is a 76 y.o. female with a hx of HTN and past GI bleed who presents to the ED via EMS c/o generalized weakness that began 2-3 days ago. The pt states that she had blood in her stool 4 days ago and has not had any hematochezia since. She reports that she was treated for a GI bleed in April 2017 and received a blood transfusion of 2 units. She also reports a decreased appetite. The pt denies cough, fever, dysuria, hematuria, abd pain, and sore throat. No other complaints or concerns at this time. PCP: Aaron Escobar MD  GI: Dr. Param Beverly        The history is provided by the patient. Past Medical History:   Diagnosis Date    Follow-up exam     Hypertension     Menopause     Thyroid disease        Past Surgical History:   Procedure Laterality Date    HX CHOLECYSTECTOMY  3/8/2010    HX OTHER SURGICAL      stints in stomach         History reviewed. No pertinent family history. Social History     Social History    Marital status:      Spouse name: N/A    Number of children: N/A    Years of education: N/A     Occupational History    Not on file. Social History Main Topics    Smoking status: Never Smoker    Smokeless tobacco: Not on file    Alcohol use No    Drug use: Not on file    Sexual activity: Not on file     Other Topics Concern    Not on file     Social History Narrative         ALLERGIES: Review of patient's allergies indicates no known allergies. Review of Systems   Constitutional: Positive for appetite change (decrease). Negative for fever. HENT: Negative for sore throat. Eyes: Negative for redness. Respiratory: Negative for shortness of breath and wheezing. Gastrointestinal: Positive for blood in stool. Negative for abdominal pain and nausea. Genitourinary: Negative for dysuria. Musculoskeletal: Negative for neck stiffness. Skin: Negative for pallor. Neurological: Positive for weakness. Negative for headaches. Subjective:   Patient ID:  Carmelina Vale is a 55 y.o. female who presents for  of Palpitations, Hyperlipidemia, and family history of heart disease      HPI:       She is here for evaluation of palpitations. She saw EP. Her event monitor revealed NSR with PACs +PVCs. Her EF is 65% without any structural heart disease.         Mother with history of heart disease      Event monitor 9/2022:   Predominant rhythm was sinus with an average rate of 91 bpm and range of  bpm.    There were no episodes of atrial fibrillation or flutter. There was a single 8-beat episode of nonsustained AT. There were otherwise rare PACs (0.15%).    There were rare PVCs (0.45% burden).    There were 334 button activations that corresponded to sinus rhythm with either PVCs or the episode of nonsustained AT.      She has HgA1c 5.9, HLP, and thyroid disorder.      The 10-year ASCVD risk score (Frederick RILEY, et al., 2019) is: 3.3%    Values used to calculate the score:      Age: 55 years      Sex: Female      Is Non- : No      Diabetic: No      Tobacco smoker: No      Systolic Blood Pressure: 131 mmHg      Is BP treated: Yes      HDL Cholesterol: 60 mg/dL      Total Cholesterol: 210 mg/dL         Patient Active Problem List    Diagnosis Date Noted    Family history of ischemic heart disease 09/26/2022         Mother with CAD      Palpitations 08/23/2022    Prediabetes 05/22/2022    Osteoarthritis of cervical spine 05/04/2022    Bilateral carpal tunnel syndrome 05/04/2022    Cognitive changes 10/08/2021    Chronic gastritis 06/21/2021    Family history of colon cancer 08/31/2020    Family history of breast cancer 08/31/2020    Premature atrial contractions 05/24/2018    Soft tissue mass 02/21/2018    GERD (gastroesophageal reflux disease) 01/17/2018     GI DR. Chopra      Mild episode of recurrent major depressive disorder 01/17/2018    Fibromyalgia 01/17/2018    H/O thyroidectomy 06/28/2017    Premature ventricular  Hematological: Does not bruise/bleed easily. All other systems reviewed and are negative. Vitals:    06/09/17 1820 06/09/17 1840 06/09/17 1900 06/09/17 1910   BP: (!) 160/36 (!) 165/34 (!) 160/38 164/42   Pulse: 64 62 65 65   Resp: 16 15 14 19   Temp:       SpO2: 98% 98% 98% 98%   Weight:                Physical Exam   Constitutional: She is oriented to person, place, and time. She appears well-developed and well-nourished. No distress. HENT:   Head: Normocephalic and atraumatic. Right Ear: External ear normal.   Left Ear: External ear normal.   Mouth/Throat: Oropharynx is clear and moist.   Eyes: Conjunctivae and EOM are normal. Pupils are equal, round, and reactive to light. No scleral icterus. Neck: Normal range of motion. Neck supple. Cardiovascular: Intact distal pulses. Capillary refill < 3 seconds   Pulmonary/Chest: Effort normal and breath sounds normal. No respiratory distress. She has no wheezes. She has no rales. Abdominal: Soft. Bowel sounds are normal. She exhibits no distension. There is no tenderness. Musculoskeletal: Normal range of motion. She exhibits no edema. Lymphadenopathy:     She has no cervical adenopathy. Neurological: She is alert and oriented to person, place, and time. No cranial nerve deficit. She exhibits normal muscle tone. Coordination normal.   Sensation intact  Strength 5/5 B/L ue and le  No slurred speech     Skin: Skin is warm and dry. She is not diaphoretic. No erythema. No pallor. Psychiatric: She has a normal mood and affect. Her behavior is normal.   Nursing note and vitals reviewed. MDM  Number of Diagnoses or Management Options  Generalized weakness:   Poor appetite:   Diagnosis management comments: ddx metabolic, infectious, assoc with poor appetite    Labs, UA    Labs reassuring    I have reassessed the patient. I have discussed the workup, results and plan with the patient and patient is in agreement. Patient is feeling better. contractions 2016     Cardiology Dr. dillon      Hypothyroidism 2016    History of colon polyps 2016           Right Arm BP - Sittin/79  Left Arm BP - Sittin/75        LABS    LAST HbA1c  Lab Results   Component Value Date    HGBA1C 5.9 (H) 2022       Lipid panel  Lab Results   Component Value Date    CHOL 210 (H) 2022    CHOL 231 (H) 2021    CHOL 234 (H) 2021     Lab Results   Component Value Date    HDL 60 2022    HDL 52 2021    HDL 55 2021     Lab Results   Component Value Date    LDLCALC 126.6 2022    LDLCALC 139.6 2021    LDLCALC 156.4 2021     Lab Results   Component Value Date    TRIG 117 2022    TRIG 197 (H) 2021    TRIG 113 2021     Lab Results   Component Value Date    CHOLHDL 28.6 2022    CHOLHDL 22.5 2021    CHOLHDL 23.5 2021            Review of Systems   Constitutional: Negative for diaphoresis, night sweats, weight gain and weight loss.   HENT:  Negative for congestion.    Eyes:  Negative for blurred vision, discharge and double vision.   Cardiovascular:  Negative for chest pain, claudication, cyanosis, dyspnea on exertion, irregular heartbeat, leg swelling, near-syncope, orthopnea, palpitations, paroxysmal nocturnal dyspnea and syncope.   Respiratory:  Negative for cough, shortness of breath and wheezing.    Endocrine: Negative for cold intolerance, heat intolerance and polyphagia.   Hematologic/Lymphatic: Negative for adenopathy and bleeding problem. Does not bruise/bleed easily.   Skin:  Negative for dry skin and nail changes.   Musculoskeletal:  Negative for arthritis, back pain, falls, joint pain, myalgias and neck pain.   Gastrointestinal:  Negative for bloating, abdominal pain, change in bowel habit and constipation.   Genitourinary:  Negative for bladder incontinence, dysuria, flank pain, genital sores and missed menses.   Neurological:  Negative for aphonia, brief  Patient was discharge in stable condition. Patient was given outpatient follow up. Patient is to return to emergency department if any new or worsening condition. Amount and/or Complexity of Data Reviewed  Tests in the medicine section of CPT®: ordered and reviewed  Review and summarize past medical records: yes  Independent visualization of images, tracings, or specimens: yes    Patient Progress  Patient progress: improved    ED Course       Procedures    Vitals:  Patient Vitals for the past 12 hrs:   Temp Pulse Resp BP SpO2   06/09/17 1910 - 65 19 164/42 98 %   06/09/17 1900 - 65 14 (!) 160/38 98 %   06/09/17 1840 - 62 15 (!) 165/34 98 %   06/09/17 1820 - 64 16 (!) 160/36 98 %   06/09/17 1800 - 63 15 (!) 160/35 98 %   06/09/17 1745 - 63 14 - 99 %   06/09/17 1743 - - - 162/75 -   06/09/17 1720 - 64 13 158/41 98 %   06/09/17 1710 - 66 15 (!) 160/37 98 %   06/09/17 1509 98.8 °F (37.1 °C) 85 17 92/60 96 %   96% on RA, indicating adequate oxygenation. Medications ordered:   Medications - No data to display      Lab findings:  Recent Results (from the past 12 hour(s))   CBC WITH AUTOMATED DIFF    Collection Time: 06/09/17  3:41 PM   Result Value Ref Range    WBC 6.6 4.6 - 13.2 K/uL    RBC 3.03 (L) 4.20 - 5.30 M/uL    HGB 7.7 (L) 12.0 - 16.0 g/dL    HCT 24.4 (L) 35.0 - 45.0 %    MCV 80.5 74.0 - 97.0 FL    MCH 25.4 24.0 - 34.0 PG    MCHC 31.6 31.0 - 37.0 g/dL    RDW 17.2 (H) 11.6 - 14.5 %    PLATELET 245 (H) 843 - 420 K/uL    MPV 10.2 9.2 - 11.8 FL    NEUTROPHILS 56 40 - 73 %    LYMPHOCYTES 27 21 - 52 %    MONOCYTES 15 (H) 3 - 10 %    EOSINOPHILS 1 0 - 5 %    BASOPHILS 1 0 - 2 %    ABS. NEUTROPHILS 3.8 1.8 - 8.0 K/UL    ABS. LYMPHOCYTES 1.8 0.9 - 3.6 K/UL    ABS. MONOCYTES 1.0 0.05 - 1.2 K/UL    ABS. EOSINOPHILS 0.1 0.0 - 0.4 K/UL    ABS.  BASOPHILS 0.1 (H) 0.0 - 0.06 K/UL    DF AUTOMATED     METABOLIC PANEL, BASIC    Collection Time: 06/09/17  3:41 PM   Result Value Ref Range    Sodium 137 136 - 145 mmol/L paralysis, difficulty with concentration, dizziness and weakness.   Psychiatric/Behavioral:  Negative for altered mental status and memory loss. The patient does not have insomnia.    Allergic/Immunologic: Negative for environmental allergies.     Objective:   Physical Exam  Constitutional:       Appearance: She is well-developed.      Interventions: She is not intubated.  HENT:      Head: Normocephalic and atraumatic.      Right Ear: External ear normal.      Left Ear: External ear normal.   Eyes:      General: No scleral icterus.        Right eye: No discharge.         Left eye: No discharge.      Conjunctiva/sclera: Conjunctivae normal.      Pupils: Pupils are equal, round, and reactive to light.   Neck:      Thyroid: No thyromegaly.      Vascular: Normal carotid pulses. No carotid bruit, hepatojugular reflux or JVD.      Trachea: No tracheal deviation.   Cardiovascular:      Rate and Rhythm: Normal rate and regular rhythm. No extrasystoles are present.     Chest Wall: PMI is not displaced.      Pulses:           Carotid pulses are 2+ on the right side and 2+ on the left side.       Radial pulses are 2+ on the right side and 2+ on the left side.        Femoral pulses are 2+ on the right side and 2+ on the left side.       Popliteal pulses are 2+ on the right side and 2+ on the left side.        Dorsalis pedis pulses are 2+ on the right side and 2+ on the left side.        Posterior tibial pulses are 2+ on the right side and 2+ on the left side.      Heart sounds: S1 normal and S2 normal. Heart sounds not distant. No midsystolic click. No murmur heard.    No friction rub. No gallop. No S3 sounds.   Pulmonary:      Effort: Pulmonary effort is normal. No tachypnea, bradypnea, accessory muscle usage or respiratory distress. She is not intubated.      Breath sounds: Normal breath sounds. No stridor. No decreased breath sounds, wheezing or rales.   Chest:      Chest wall: No tenderness.   Abdominal:      General: There  Potassium 4.1 3.5 - 5.5 mmol/L    Chloride 105 100 - 108 mmol/L    CO2 23 21 - 32 mmol/L    Anion gap 9 3.0 - 18 mmol/L    Glucose 107 (H) 74 - 99 mg/dL    BUN 16 7.0 - 18 MG/DL    Creatinine 1.06 0.6 - 1.3 MG/DL    BUN/Creatinine ratio 15 12 - 20      GFR est AA >60 >60 ml/min/1.73m2    GFR est non-AA 50 (L) >60 ml/min/1.73m2    Calcium 9.6 8.5 - 10.1 MG/DL   TYPE & SCREEN    Collection Time: 06/09/17  3:41 PM   Result Value Ref Range    Crossmatch Expiration 06/12/2017     ABO/Rh(D) A POSITIVE     Antibody screen NEG    CARDIAC PANEL,(CK, CKMB & TROPONIN)    Collection Time: 06/09/17  3:41 PM   Result Value Ref Range    CK 28 26 - 192 U/L    CK - MB <1.0 <3.6 ng/ml    CK-MB Index Cannot be calulated 0.0 - 4.0 %    Troponin-I, Qt. <0.02 0.0 - 0.045 NG/ML   URINALYSIS W/ RFLX MICROSCOPIC    Collection Time: 06/09/17  3:45 PM   Result Value Ref Range    Color YELLOW      Appearance CLEAR      Specific gravity 1.012 1.005 - 1.030      pH (UA) 5.5 5.0 - 8.0      Protein NEGATIVE  NEG mg/dL    Glucose NEGATIVE  NEG mg/dL    Ketone NEGATIVE  NEG mg/dL    Bilirubin NEGATIVE  NEG      Blood NEGATIVE  NEG      Urobilinogen 0.2 0.2 - 1.0 EU/dL    Nitrites NEGATIVE  NEG      Leukocyte Esterase TRACE (A) NEG     URINE MICROSCOPIC ONLY    Collection Time: 06/09/17  3:45 PM   Result Value Ref Range    WBC 0 to 1 0 - 4 /hpf    RBC NEGATIVE  0 - 5 /hpf    Epithelial cells FEW 0 - 5 /lpf    Bacteria NEGATIVE  NEG /hpf   EKG, 12 LEAD, INITIAL    Collection Time: 06/09/17  4:21 PM   Result Value Ref Range    Ventricular Rate 68 BPM    Atrial Rate 68 BPM    P-R Interval 152 ms    QRS Duration 74 ms    Q-T Interval 384 ms    QTC Calculation (Bezet) 408 ms    Calculated P Axis 44 degrees    Calculated R Axis 11 degrees    Calculated T Axis 49 degrees    Diagnosis       Normal sinus rhythm  Possible Anterior infarct , age undetermined  Abnormal ECG  When compared with ECG of 10-APR-2017 09:04,  Borderline criteria for Anterior infarct is no distension or abdominal bruit.      Palpations: There is no mass or pulsatile mass.      Tenderness: There is no abdominal tenderness. There is no guarding or rebound.   Musculoskeletal:         General: No tenderness. Normal range of motion.      Cervical back: Normal range of motion and neck supple.   Lymphadenopathy:      Cervical: No cervical adenopathy.   Skin:     General: Skin is warm.      Coloration: Skin is not pale.      Findings: No erythema or rash.   Neurological:      Mental Status: She is alert and oriented to person, place, and time.      Cranial Nerves: No cranial nerve deficit.      Coordination: Coordination normal.      Deep Tendon Reflexes: Reflexes are normal and symmetric.   Psychiatric:         Behavior: Behavior normal.         Thought Content: Thought content normal.         Judgment: Judgment normal.       Assessment:     1. Family history of chronic ischemic heart disease    2. Premature atrial contractions    3. Premature ventricular contractions    4. Palpitations    5. Family history of colon cancer        Plan:         Calcium score for CAD risk stratification  Virtual follow up thereafter        If abnormal she will start aspirin + statin   Exercise  Weight loss  Dietary changes             Continue with current medical plan and lifestyle changes.  Return sooner for concerns or questions. If symptoms persist go to the ED  I have reviewed all pertinent data on this patient       I have reviewed the patient's medical history in detail and updated the computerized patient record.    Orders Placed This Encounter   Procedures    CT Cardiac Scoring     Standing Status:   Future     Standing Expiration Date:   9/26/2023     Order Specific Question:   May the Radiologist modify the order per protocol to meet the clinical needs of the patient?     Answer:   Yes       Follow up as scheduled. Return sooner for concerns or questions            She expressed verbal understanding and agreed  are now present  Nonspecific T wave abnormality no longer evident in Lateral leads         EKG interpretation by ED Physician:  1622 NSR, normal qrs duration, normal axis, no st elevation, no st depressions. Has t wave inversion in V2 which is seen in old ekg 4/10/17    X-Ray, CT or other radiology findings or impressions:  No orders to display           Disposition:  Diagnosis:   1. Poor appetite    2. Generalized weakness        Disposition: discharged    Follow-up Information     Follow up With Details Comments [address], [place] Call in 2 days  [facility] EMERGENCY DEPT  As needed, If symptoms worsen [address]  [phone]           Discharge Medication List as of 6/9/2017  7:03 PM      CONTINUE these medications which have NOT CHANGED    Details   pantoprazole (PROTONIX) 40 mg tablet Take 1 Tab by mouth Before breakfast and dinner., Print, Disp-60 Tab, R-0      diltiazem XR (DILACOR XR) 120 mg XR capsule Take  by mouth daily. , Historical Med      metoprolol (LOPRESSOR) 25 mg tablet Take  by mouth two (2) times a day., Historical Med      raloxifene (EVISTA) 60 mg tablet Take  by mouth daily. , Historical Med      lisinopril (PRINIVIL, ZESTRIL) 20 mg tablet Take  by mouth daily. , Historical Med      LEVOTHYROXINE SODIUM (SYNTHROID PO) Take  by mouth., Historical Med               SCRIBE ATTESTATION STATEMENT  Documented by: [facility]ata ward for, and in the presence of, Dr. Roni Jack 06/09/17 7:40 PM     Signed by: Fito Carcamo, 06/09/17 7:40 PM     PROVIDER ATTESTATION STATEMENT  I personally performed the services described in the documentation, reviewed the documentation, as recorded by the scribe in my presence, and it accurately and completely records my words and actions.   Dr. Roni Jack with the plan        Patient's Medications   New Prescriptions    No medications on file   Previous Medications    BIFIDOBACTERIUM INFANTIS (ALIGN ORAL)    Align Take 1 time per day No date recorded No form recorded 1 time per day No route recorded No set duration recorded No set duration amount recorded active No dosage strength recorded No dosage strength units of measure recorded    BUPROPION (WELLBUTRIN XL) 300 MG 24 HR TABLET    Take 300 mg by mouth once daily.    BUPROPION (WELLBUTRIN) 100 MG TABLET    Take 1 tablet (100 mg total) by mouth once daily.    CALCIUM CARBONATE-VITAMIN D3 (CALCIUM 600 + D,3,) 600-125 MG-UNIT TAB        CHOLECALCIFEROL, VITAMIN D3, 125 MCG (5,000 UNIT) TAB    Take 5,000 Units by mouth once daily.    DEXILANT 30 MG CPDM    2 (two) times daily.    DULOXETINE (CYMBALTA) 30 MG CAPSULE    Take 30 mg by mouth once daily.    DULOXETINE (CYMBALTA) 60 MG CAPSULE    Take 60 mg by mouth. 1 Capsule, Delayed Release(E.C.) Oral Every day    FISH,BORA,FLAX OILS-OM3,6,9NO1 (OMEGA 3-6-9) 1,200 MG CAP        LORAZEPAM (ATIVAN) 1 MG TABLET    TK 1 T PO QD    LOSARTAN (COZAAR) 50 MG TABLET    TAKE 1 TABLET(50 MG) BY MOUTH EVERY DAY    SYNTHROID 100 MCG TABLET    TAKE 1 TABLET(100 MCG) BY MOUTH EVERY DAY    VITAMIN B COMP AND C NO.3 (B COMPLEX PLUS VITAMIN C) 15-10-50-5-300 MG CAP        ZOVIRAX 5 % CREA    APPLY TO AFFECTED AREA PRF FEVER BLISTERS   Modified Medications    No medications on file   Discontinued Medications    LAMOTRIGINE (LAMICTAL) 25 MG TABLET    Take 25 mg by mouth once daily.

## (undated) DEVICE — SYR 50ML SLIP TIP NSAF LF STRL --

## (undated) DEVICE — CONTAINER PREFIL FRMLN 15ML --

## (undated) DEVICE — BITE BLK ENDOSCP AD 54FR GRN POLYETH ENDOSCP W STRP SLD

## (undated) DEVICE — BIPOLAR ELECTROHEMOSTASIS CATHETER: Brand: GOLD PROBE

## (undated) DEVICE — FORCEPS BX L240CM JAW DIA2.8MM L CAP W/ NDL MIC MESH TOOTH

## (undated) DEVICE — DEVICE INFL 60ML 12ATM CONVENIENT LOK REL HNDL HI PRSS FLX

## (undated) DEVICE — KIT COLON W/ 1.1OZ LUB AND 2 END

## (undated) DEVICE — REM POLYHESIVE ADULT PATIENT RETURN ELECTRODE: Brand: VALLEYLAB

## (undated) DEVICE — FLEX ADVANTAGE 1500CC: Brand: FLEX ADVANTAGE

## (undated) DEVICE — KENDALL 500 SERIES DIAPHORETIC FOAM MONITORING ELECTRODE - TEAR DROP SHAPE ( 30/PK): Brand: KENDALL

## (undated) DEVICE — FIAPC® PROBE W/ FILTER 2200 A OD 2.3MM/6.9FR; L 2.2M/7.2FT: Brand: ERBE

## (undated) DEVICE — MEDI-VAC NON-CONDUCTIVE SUCTION TUBING: Brand: CARDINAL HEALTH

## (undated) DEVICE — BASIN EMESIS 500CC ROSE 250/CS 60/PLT: Brand: MEDEGEN MEDICAL PRODUCTS, LLC